# Patient Record
Sex: FEMALE
[De-identification: names, ages, dates, MRNs, and addresses within clinical notes are randomized per-mention and may not be internally consistent; named-entity substitution may affect disease eponyms.]

---

## 2017-03-30 ENCOUNTER — HOSPITAL (OUTPATIENT)
Dept: OTHER | Age: 22
End: 2017-03-30
Attending: EMERGENCY MEDICINE

## 2017-03-30 LAB
ANALYZER ANC (IANC): ABNORMAL
ANION GAP SERPL CALC-SCNC: 14 MMOL/L (ref 10–20)
APPEARANCE UR: CLEAR
BASOPHILS # BLD: 0 THOUSAND/MCL (ref 0–0.3)
BASOPHILS NFR BLD: 0 %
BILIRUB UR QL STRIP: NEGATIVE
BUN SERPL-MCNC: 8 MG/DL (ref 6–20)
BUN/CREAT SERPL: 12 (ref 7–25)
CALCIUM SERPL-MCNC: 9.2 MG/DL (ref 8.4–10.2)
CHLORIDE: 104 MMOL/L (ref 98–107)
CO2 SERPL-SCNC: 26 MMOL/L (ref 21–32)
COLOR UR: YELLOW
CREAT SERPL-MCNC: 0.65 MG/DL (ref 0.51–0.95)
DIFFERENTIAL METHOD BLD: ABNORMAL
EOSINOPHIL # BLD: 0 THOUSAND/MCL (ref 0.1–0.5)
EOSINOPHIL NFR BLD: 0 %
ERYTHROCYTE [DISTWIDTH] IN BLOOD: 13.6 % (ref 11–15)
GLUCOSE SERPL-MCNC: 109 MG/DL (ref 65–99)
GLUCOSE UR STRIP-MCNC: NEGATIVE MG/DL
HEMATOCRIT: 42.4 % (ref 36–46.5)
HEMOCCULT STL QL: NEGATIVE
HGB BLD-MCNC: 14.4 GM/DL (ref 12–15.5)
KETONES UR STRIP-MCNC: ABNORMAL MG/DL
LEUKOCYTE ESTERASE UR QL STRIP: NEGATIVE
LYMPHOCYTES # BLD: 1.9 THOUSAND/MCL (ref 1.2–5.2)
LYMPHOCYTES NFR BLD: 16 %
MCH RBC QN AUTO: 30.7 PG (ref 26–34)
MCHC RBC AUTO-ENTMCNC: 34 GM/DL (ref 32–36.5)
MCV RBC AUTO: 90.4 FL (ref 78–100)
MONOCYTES # BLD: 0.6 THOUSAND/MCL (ref 0.3–0.9)
MONOCYTES NFR BLD: 5 %
NEUTROPHILS # BLD: 9.3 THOUSAND/MCL (ref 1.8–8)
NEUTROPHILS NFR BLD: 79 %
NEUTS SEG NFR BLD: ABNORMAL %
NITRITE UR QL STRIP: NEGATIVE
PERCENT NRBC: ABNORMAL
PH UR STRIP: 7.5 UNIT (ref 5–7)
PLATELET # BLD: 294 THOUSAND/MCL (ref 140–450)
POTASSIUM SERPL-SCNC: 4 MMOL/L (ref 3.4–5.1)
PROT UR STRIP-MCNC: NEGATIVE MG/DL
RBC # BLD: 4.69 MILLION/MCL (ref 4–5.2)
SODIUM SERPL-SCNC: 140 MMOL/L (ref 135–145)
SP GR UR STRIP: 1.02 (ref 1–1.03)
UROBILINOGEN UR STRIP-MCNC: 0.2 MG/DL (ref 0–1)
WBC # BLD: 11.8 THOUSAND/MCL (ref 4.2–11)

## 2017-04-03 ENCOUNTER — HOSPITAL (OUTPATIENT)
Dept: OTHER | Age: 22
End: 2017-04-03
Attending: EMERGENCY MEDICINE

## 2017-04-03 LAB
ANALYZER ANC (IANC): ABNORMAL
ANION GAP SERPL CALC-SCNC: 11 MMOL/L (ref 10–20)
APPEARANCE UR: CLEAR
BASOPHILS # BLD: 0 THOUSAND/MCL (ref 0–0.3)
BASOPHILS NFR BLD: 0 %
BILIRUB UR QL STRIP: NEGATIVE
BUN SERPL-MCNC: 14 MG/DL (ref 6–20)
BUN/CREAT SERPL: 22 (ref 7–25)
CALCIUM SERPL-MCNC: 8.9 MG/DL (ref 8.4–10.2)
CHLORIDE: 107 MMOL/L (ref 98–107)
CO2 SERPL-SCNC: 25 MMOL/L (ref 21–32)
COLOR UR: YELLOW
CREAT SERPL-MCNC: 0.65 MG/DL (ref 0.51–0.95)
DIFFERENTIAL METHOD BLD: ABNORMAL
EOSINOPHIL # BLD: 0.1 THOUSAND/MCL (ref 0.1–0.5)
EOSINOPHIL NFR BLD: 1 %
ERYTHROCYTE [DISTWIDTH] IN BLOOD: 13.5 % (ref 11–15)
GLUCOSE SERPL-MCNC: 101 MG/DL (ref 65–99)
GLUCOSE UR STRIP-MCNC: NEGATIVE MG/DL
HEMATOCRIT: 41.5 % (ref 36–46.5)
HEMOCCULT STL QL: ABNORMAL
HGB BLD-MCNC: 14 GM/DL (ref 12–15.5)
KETONES UR STRIP-MCNC: NEGATIVE MG/DL
LEUKOCYTE ESTERASE UR QL STRIP: NEGATIVE
LYMPHOCYTES # BLD: 2.2 THOUSAND/MCL (ref 1–4.8)
LYMPHOCYTES NFR BLD: 21 %
MCH RBC QN AUTO: 30.6 PG (ref 26–34)
MCHC RBC AUTO-ENTMCNC: 33.7 GM/DL (ref 32–36.5)
MCV RBC AUTO: 90.6 FL (ref 78–100)
MONOCYTES # BLD: 0.5 THOUSAND/MCL (ref 0.3–0.9)
MONOCYTES NFR BLD: 5 %
NEUTROPHILS # BLD: 8.1 THOUSAND/MCL (ref 1.8–7.7)
NEUTROPHILS NFR BLD: 73 %
NEUTS SEG NFR BLD: ABNORMAL %
NITRITE UR QL STRIP: NEGATIVE
PERCENT NRBC: ABNORMAL
PH UR STRIP: 5.5 UNIT (ref 5–7)
PLATELET # BLD: 285 THOUSAND/MCL (ref 140–450)
POTASSIUM SERPL-SCNC: 3.9 MMOL/L (ref 3.4–5.1)
PROT UR STRIP-MCNC: NEGATIVE MG/DL
RBC # BLD: 4.58 MILLION/MCL (ref 4–5.2)
SODIUM SERPL-SCNC: 139 MMOL/L (ref 135–145)
SP GR UR STRIP: 1.01 (ref 1–1.03)
UROBILINOGEN UR STRIP-MCNC: 0.2 MG/DL (ref 0–1)
WBC # BLD: 10.9 THOUSAND/MCL (ref 4.2–11)

## 2017-11-08 ENCOUNTER — OFFICE VISIT (OUTPATIENT)
Dept: INTERNAL MEDICINE CLINIC | Facility: HOSPITAL | Age: 22
End: 2017-11-08
Attending: EMERGENCY MEDICINE

## 2017-11-08 DIAGNOSIS — Z00.00 WELLNESS EXAMINATION: Primary | ICD-10-CM

## 2017-11-08 PROCEDURE — 86787 VARICELLA-ZOSTER ANTIBODY: CPT

## 2018-03-08 ENCOUNTER — HOSPITAL (OUTPATIENT)
Dept: OTHER | Age: 23
End: 2018-03-08
Attending: EMERGENCY MEDICINE

## 2018-03-08 LAB
ALBUMIN SERPL-MCNC: 3.5 GM/DL (ref 3.6–5.1)
ALP SERPL-CCNC: 60 UNIT/L (ref 45–117)
ALT SERPL-CCNC: 13 UNIT/L
ANALYZER ANC (IANC): NORMAL
ANION GAP SERPL CALC-SCNC: 12 MMOL/L (ref 10–20)
APPEARANCE UR: CLEAR
AST SERPL-CCNC: 12 UNIT/L
BASOPHILS # BLD: 0 THOUSAND/MCL (ref 0–0.3)
BASOPHILS NFR BLD: 0 %
BILIRUB CONJ SERPL-MCNC: 0.1 MG/DL (ref 0–0.2)
BILIRUB SERPL-MCNC: 0.4 MG/DL (ref 0.2–1)
BILIRUB UR QL STRIP: NEGATIVE
BUN SERPL-MCNC: 16 MG/DL (ref 6–20)
BUN/CREAT SERPL: 20 (ref 7–25)
CALCIUM SERPL-MCNC: 8.8 MG/DL (ref 8.4–10.2)
CHLORIDE: 102 MMOL/L (ref 98–107)
CO2 SERPL-SCNC: 26 MMOL/L (ref 21–32)
COLOR UR: YELLOW
CREAT SERPL-MCNC: 0.8 MG/DL (ref 0.51–0.95)
DIFFERENTIAL METHOD BLD: NORMAL
EOSINOPHIL # BLD: 0.2 THOUSAND/MCL (ref 0.1–0.5)
EOSINOPHIL NFR BLD: 2 %
ERYTHROCYTE [DISTWIDTH] IN BLOOD: 13.7 % (ref 11–15)
GLUCOSE SERPL-MCNC: 101 MG/DL (ref 65–99)
GLUCOSE UR STRIP-MCNC: NEGATIVE MG/DL
HEMATOCRIT: 40.3 % (ref 36–46.5)
HEMOCCULT STL QL: NEGATIVE
HGB BLD-MCNC: 13.4 GM/DL (ref 12–15.5)
KETONES UR STRIP-MCNC: NEGATIVE MG/DL
LEUKOCYTE ESTERASE UR QL STRIP: NEGATIVE
LIPASE SERPL-CCNC: 77 UNIT/L (ref 73–393)
LYMPHOCYTES # BLD: 3.5 THOUSAND/MCL (ref 1–4.8)
LYMPHOCYTES NFR BLD: 38 %
MCH RBC QN AUTO: 30 PG (ref 26–34)
MCHC RBC AUTO-ENTMCNC: 33.3 GM/DL (ref 32–36.5)
MCV RBC AUTO: 90.4 FL (ref 78–100)
MONOCYTES # BLD: 0.6 THOUSAND/MCL (ref 0.3–0.9)
MONOCYTES NFR BLD: 7 %
NEUTROPHILS # BLD: 4.9 THOUSAND/MCL (ref 1.8–7.7)
NEUTROPHILS NFR BLD: 53 %
NEUTS SEG NFR BLD: NORMAL %
NITRITE UR QL STRIP: NEGATIVE
PERCENT NRBC: NORMAL
PH UR STRIP: 6 UNIT (ref 5–7)
PLATELET # BLD: 289 THOUSAND/MCL (ref 140–450)
POTASSIUM SERPL-SCNC: 3.6 MMOL/L (ref 3.4–5.1)
PROT SERPL-MCNC: 7.6 GM/DL (ref 6.4–8.2)
PROT UR STRIP-MCNC: NEGATIVE MG/DL
RBC # BLD: 4.46 MILLION/MCL (ref 4–5.2)
SODIUM SERPL-SCNC: 136 MMOL/L (ref 135–145)
SP GR UR STRIP: 1.02 (ref 1–1.03)
UROBILINOGEN UR STRIP-MCNC: 0.2 MG/DL (ref 0–1)
WBC # BLD: 9.2 THOUSAND/MCL (ref 4.2–11)

## 2018-03-29 ENCOUNTER — HOSPITAL ENCOUNTER (EMERGENCY)
Facility: HOSPITAL | Age: 23
Discharge: HOME | End: 2018-03-29
Attending: EMERGENCY MEDICINE
Payer: MEDICAID

## 2018-03-29 VITALS
HEIGHT: 61 IN | HEART RATE: 78 BPM | RESPIRATION RATE: 20 BRPM | OXYGEN SATURATION: 97 % | SYSTOLIC BLOOD PRESSURE: 114 MMHG | TEMPERATURE: 97 F | DIASTOLIC BLOOD PRESSURE: 77 MMHG | BODY MASS INDEX: 30.21 KG/M2 | WEIGHT: 160 LBS

## 2018-03-29 DIAGNOSIS — R11.2 NON-INTRACTABLE VOMITING WITH NAUSEA, UNSPECIFIED VOMITING TYPE: Primary | ICD-10-CM

## 2018-03-29 PROCEDURE — 81025 URINE PREGNANCY TEST: CPT

## 2018-03-29 PROCEDURE — 99283 EMERGENCY DEPT VISIT LOW MDM: CPT

## 2018-03-29 RX ORDER — ONDANSETRON 4 MG/1
4 TABLET, ORALLY DISINTEGRATING ORAL EVERY 4 HOURS PRN
Qty: 10 TABLET | Refills: 0 | Status: SHIPPED | OUTPATIENT
Start: 2018-03-29 | End: 2018-04-05

## 2018-03-29 RX ORDER — ONDANSETRON 4 MG/1
TABLET, ORALLY DISINTEGRATING ORAL
Status: COMPLETED
Start: 2018-03-29 | End: 2018-03-29

## 2018-03-29 RX ORDER — ONDANSETRON 4 MG/1
4 TABLET, ORALLY DISINTEGRATING ORAL ONCE
Status: COMPLETED | OUTPATIENT
Start: 2018-03-29 | End: 2018-03-29

## 2018-03-29 NOTE — ED PROVIDER NOTES
Patient Seen in: [facility] AND [facility] Emergency Department    History   Patient presents with:  Nausea/Vomiting/Diarrhea (gastrointestinal)    Stated Complaint: vomit     HPI    32-year-old female with no significant past medical history here with complaint °C) (Oral)   Resp 18   Ht 154.9 cm (5' 1\")   Wt 72.6 kg   SpO2 97%   BMI 30.23 kg/m²         Physical Exam   Constitutional: She is oriented to person, place, and time. She appears well-developed and well-nourished. No distress.    HENT:   Head: Normocepha elevated blood pressure reading in the Emergency Department. They were informed of the dangers of undiagnosed and untreated hypertension.   Education regarding lifestyle modifications and the need for appropriate follow-up with their PCP to have their bloo

## 2018-03-29 NOTE — ED INITIAL ASSESSMENT (HPI)
Pt c/o N/V starting yesterday + mid abdominal tightness, denies abdominal pain/constipation/diarrhea/ indigestion/GERD symptom/fever

## 2018-05-19 ENCOUNTER — HOSPITAL (OUTPATIENT)
Dept: OTHER | Age: 23
End: 2018-05-19
Attending: NURSE PRACTITIONER

## 2019-07-22 ENCOUNTER — HOSPITAL ENCOUNTER (EMERGENCY)
Facility: HOSPITAL | Age: 24
Discharge: HOME | End: 2019-07-22
Attending: EMERGENCY MEDICINE

## 2019-07-22 VITALS
BODY MASS INDEX: 33.04 KG/M2 | DIASTOLIC BLOOD PRESSURE: 67 MMHG | SYSTOLIC BLOOD PRESSURE: 127 MMHG | HEIGHT: 61 IN | TEMPERATURE: 99 F | RESPIRATION RATE: 18 BRPM | OXYGEN SATURATION: 100 % | WEIGHT: 175 LBS | HEART RATE: 74 BPM

## 2019-07-22 DIAGNOSIS — J01.10 ACUTE NON-RECURRENT FRONTAL SINUSITIS: ICD-10-CM

## 2019-07-22 DIAGNOSIS — J40 BRONCHITIS: Primary | ICD-10-CM

## 2019-07-22 PROCEDURE — 99283 EMERGENCY DEPT VISIT LOW MDM: CPT

## 2019-07-22 RX ORDER — PREDNISONE 20 MG/1
40 TABLET ORAL DAILY
Qty: 8 TABLET | Refills: 0 | Status: SHIPPED | OUTPATIENT
Start: 2019-07-22 | End: 2019-07-26

## 2019-07-22 RX ORDER — ACETAMINOPHEN 500 MG
1000 TABLET ORAL ONCE
Status: COMPLETED | OUTPATIENT
Start: 2019-07-22 | End: 2019-07-22

## 2019-07-22 NOTE — ED PROVIDER NOTES
Patient Seen in: [facility] AND [facility] Emergency Department    History   Patient presents with:  Headache (neurologic)    Stated Complaint: sore throat, headache, cough    HPI    23 yo female smoker with a h/o asthma.  She presents with at least one month of a normal mood and affect. Her behavior is normal.   Nursing note and vitals reviewed. ED Course   Labs Reviewed - No data to display           MDM         This is likely viral. Discharge on prednisone. Continue albuterol.  Also discussed smoking c

## 2019-07-26 ENCOUNTER — HOSPITAL ENCOUNTER (EMERGENCY)
Facility: HOSPITAL | Age: 24
Discharge: HOME | End: 2019-07-26
Attending: PHYSICIAN ASSISTANT

## 2019-07-26 VITALS
RESPIRATION RATE: 16 BRPM | HEART RATE: 88 BPM | OXYGEN SATURATION: 99 % | BODY MASS INDEX: 33 KG/M2 | WEIGHT: 174.19 LBS | TEMPERATURE: 98 F | DIASTOLIC BLOOD PRESSURE: 92 MMHG | SYSTOLIC BLOOD PRESSURE: 132 MMHG

## 2019-07-26 DIAGNOSIS — J02.9 ACUTE VIRAL PHARYNGITIS: Primary | ICD-10-CM

## 2019-07-26 LAB — S PYO AG THROAT QL: NEGATIVE

## 2019-07-26 PROCEDURE — 87430 STREP A AG IA: CPT

## 2019-07-26 PROCEDURE — 99282 EMERGENCY DEPT VISIT SF MDM: CPT

## 2019-07-26 RX ORDER — IBUPROFEN 600 MG/1
TABLET ORAL
Qty: 20 TABLET | Refills: 0 | Status: SHIPPED | OUTPATIENT
Start: 2019-07-26

## 2019-07-26 NOTE — ED INITIAL ASSESSMENT (HPI)
C/o sore throat and swollen lymph nodes over the last few days. Pt states she was here earlier this week, diagnosed with bronchitis and given prednisone with one dose remaining. Cough and fevers have resolved but the sore throat is new.  Denies n/v or DARLENE

## 2019-07-26 NOTE — ED PROVIDER NOTES
Patient Seen in: [facility] AND [facility] Emergency Department    History   Patient presents with:  Cough/URI    Stated Complaint:     HPI    21-year-old female presents with chief complaint of sore throat. Onset 3-4 days ago.   Patient states she was recently SpO2 99%   BMI 32.91 kg/m²       PULSE OX within normal limits on room air as interpreted by this provider. Constitutional: The patient is cooperative. Appears well-developed and well-nourished. Mild discomfort.   Psychological: Alert, No abnormalities [phone]    Schedule an appointment as soon as possible for a visit in 2 days  For follow-up    We recommend that you schedule follow up care with a primary care provider within the next three months to obtain basic health screening including re

## 2020-01-10 ENCOUNTER — HOSPITAL ENCOUNTER (EMERGENCY)
Facility: HOSPITAL | Age: 25
Discharge: HOME | End: 2020-01-10
Attending: EMERGENCY MEDICINE

## 2020-01-10 ENCOUNTER — APPOINTMENT (OUTPATIENT)
Dept: GENERAL RADIOLOGY | Facility: HOSPITAL | Age: 25
End: 2020-01-10
Attending: EMERGENCY MEDICINE

## 2020-01-10 VITALS
DIASTOLIC BLOOD PRESSURE: 64 MMHG | BODY MASS INDEX: 33.04 KG/M2 | TEMPERATURE: 99 F | SYSTOLIC BLOOD PRESSURE: 132 MMHG | HEART RATE: 52 BPM | WEIGHT: 175 LBS | OXYGEN SATURATION: 96 % | RESPIRATION RATE: 13 BRPM | HEIGHT: 61 IN

## 2020-01-10 DIAGNOSIS — J45.21 MILD INTERMITTENT ASTHMA WITH EXACERBATION: Primary | ICD-10-CM

## 2020-01-10 PROCEDURE — 71046 X-RAY EXAM CHEST 2 VIEWS: CPT | Performed by: EMERGENCY MEDICINE

## 2020-01-10 PROCEDURE — 94640 AIRWAY INHALATION TREATMENT: CPT

## 2020-01-10 PROCEDURE — 99284 EMERGENCY DEPT VISIT MOD MDM: CPT

## 2020-01-10 RX ORDER — ALBUTEROL SULFATE 2.5 MG/3ML
2.5 SOLUTION RESPIRATORY (INHALATION) EVERY 4 HOURS PRN
Qty: 30 AMPULE | Refills: 0 | Status: SHIPPED | OUTPATIENT
Start: 2020-01-10 | End: 2020-02-09

## 2020-01-10 RX ORDER — PREDNISONE 20 MG/1
60 TABLET ORAL DAILY
Qty: 15 TABLET | Refills: 0 | Status: SHIPPED | OUTPATIENT
Start: 2020-01-10 | End: 2020-01-15

## 2020-01-10 RX ORDER — IPRATROPIUM BROMIDE AND ALBUTEROL SULFATE 2.5; .5 MG/3ML; MG/3ML
3 SOLUTION RESPIRATORY (INHALATION) ONCE
Status: COMPLETED | OUTPATIENT
Start: 2020-01-10 | End: 2020-01-10

## 2020-01-10 RX ORDER — PREDNISONE 20 MG/1
60 TABLET ORAL ONCE
Status: COMPLETED | OUTPATIENT
Start: 2020-01-10 | End: 2020-01-10

## 2020-01-10 NOTE — ED PROVIDER NOTES
Patient Seen in: [facility] AND [facility] Emergency Department      History   Patient presents with:  Cough/URI    Stated Complaint: COUGH 1 WEEK    HPI    72-year-old female presents the ER with complaint of asthma exacerbation.   Patient states his been feelin General: Bowel sounds are normal.      Palpations: Abdomen is soft. Musculoskeletal: Normal range of motion. Skin:     General: Skin is warm. Capillary Refill: Capillary refill takes less than 2 seconds.    Neurological:      General: No focal defi

## 2020-12-27 ENCOUNTER — HOSPITAL ENCOUNTER (EMERGENCY)
Facility: HOSPITAL | Age: 25
Discharge: HOME | End: 2020-12-27
Attending: EMERGENCY MEDICINE

## 2020-12-27 VITALS
HEART RATE: 80 BPM | BODY MASS INDEX: 35.5 KG/M2 | TEMPERATURE: 98 F | SYSTOLIC BLOOD PRESSURE: 134 MMHG | WEIGHT: 188 LBS | DIASTOLIC BLOOD PRESSURE: 76 MMHG | RESPIRATION RATE: 16 BRPM | OXYGEN SATURATION: 97 % | HEIGHT: 61 IN

## 2020-12-27 DIAGNOSIS — K00.7 TOOTH ERUPTION: Primary | ICD-10-CM

## 2020-12-27 PROCEDURE — 99283 EMERGENCY DEPT VISIT LOW MDM: CPT

## 2020-12-27 RX ORDER — CLINDAMYCIN HYDROCHLORIDE 300 MG/1
300 CAPSULE ORAL 3 TIMES DAILY
Qty: 21 CAPSULE | Refills: 0 | Status: SHIPPED | OUTPATIENT
Start: 2020-12-27 | End: 2021-01-03

## 2020-12-27 RX ORDER — HYDROCODONE BITARTRATE AND ACETAMINOPHEN 5; 325 MG/1; MG/1
1-2 TABLET ORAL EVERY 6 HOURS PRN
Qty: 10 TABLET | Refills: 0 | Status: SHIPPED | OUTPATIENT
Start: 2020-12-27 | End: 2021-01-03

## 2020-12-27 NOTE — ED PROVIDER NOTES
Patient Seen in: [facility] AND [facility] Emergency Department      History   Patient presents with:  Dental Problem    Stated Complaint: dental complaint    HPI    50-year-old female with history of asthma presents with complaints of dental pain.   The patient tender  Skin: no rashes or lesions    ED Course   Labs Reviewed - No data to display             MDM      We will discharge home with empiric antibiotics and recommendation that she follow-up with oral surgery or a dentist tomorrow if possible.   She is adv

## 2021-05-14 ENCOUNTER — NURSE ONLY (OUTPATIENT)
Dept: FAMILY MEDICINE | Age: 26
End: 2021-05-14

## 2021-05-14 DIAGNOSIS — Z20.822 EXPOSURE TO COVID-19 VIRUS: Primary | ICD-10-CM

## 2021-05-14 LAB — SARS-COV+SARS-COV-2 AG RESP QL IA.RAPID: NOT DETECTED

## 2021-05-14 PROCEDURE — 87426 SARSCOV CORONAVIRUS AG IA: CPT | Performed by: NURSE PRACTITIONER

## 2021-07-20 ENCOUNTER — HOSPITAL ENCOUNTER (EMERGENCY)
Facility: HOSPITAL | Age: 26
Discharge: HOME | End: 2021-07-20

## 2021-07-20 VITALS
TEMPERATURE: 98 F | SYSTOLIC BLOOD PRESSURE: 133 MMHG | RESPIRATION RATE: 18 BRPM | DIASTOLIC BLOOD PRESSURE: 88 MMHG | HEART RATE: 94 BPM | OXYGEN SATURATION: 99 %

## 2021-07-20 DIAGNOSIS — Z32.02 NEGATIVE PREGNANCY TEST: ICD-10-CM

## 2021-07-20 DIAGNOSIS — R11.2 NAUSEA AND VOMITING IN ADULT: Primary | ICD-10-CM

## 2021-07-20 LAB
ANION GAP SERPL CALC-SCNC: 7 MMOL/L (ref 0–18)
B-HCG SERPL-ACNC: <1 MIU/ML
B-HCG UR QL: NEGATIVE
BASOPHILS # BLD AUTO: 0.08 X10(3) UL (ref 0–0.2)
BASOPHILS NFR BLD AUTO: 0.7 %
BILIRUB UR QL: NEGATIVE
BUN BLD-MCNC: 9 MG/DL (ref 7–18)
BUN/CREAT SERPL: 11.5 (ref 10–20)
CALCIUM BLD-MCNC: 9.4 MG/DL (ref 8.5–10.1)
CHLORIDE SERPL-SCNC: 106 MMOL/L (ref 98–112)
CO2 SERPL-SCNC: 25 MMOL/L (ref 21–32)
COLOR UR: YELLOW
CREAT BLD-MCNC: 0.78 MG/DL
DEPRECATED RDW RBC AUTO: 43.3 FL (ref 35.1–46.3)
EOSINOPHIL # BLD AUTO: 0.1 X10(3) UL (ref 0–0.7)
EOSINOPHIL NFR BLD AUTO: 0.8 %
ERYTHROCYTE [DISTWIDTH] IN BLOOD BY AUTOMATED COUNT: 13.1 % (ref 11–15)
GLUCOSE BLD-MCNC: 84 MG/DL (ref 70–99)
GLUCOSE UR-MCNC: NEGATIVE MG/DL
HCT VFR BLD AUTO: 42.4 %
HGB BLD-MCNC: 14.4 G/DL
HGB UR QL STRIP.AUTO: NEGATIVE
IMM GRANULOCYTES # BLD AUTO: 0.04 X10(3) UL (ref 0–1)
IMM GRANULOCYTES NFR BLD: 0.3 %
KETONES UR-MCNC: 80 MG/DL
LYMPHOCYTES # BLD AUTO: 2.61 X10(3) UL (ref 1–4)
LYMPHOCYTES NFR BLD AUTO: 22.1 %
MCH RBC QN AUTO: 30.6 PG (ref 26–34)
MCHC RBC AUTO-ENTMCNC: 34 G/DL (ref 31–37)
MCV RBC AUTO: 90.2 FL
MONOCYTES # BLD AUTO: 0.61 X10(3) UL (ref 0.1–1)
MONOCYTES NFR BLD AUTO: 5.2 %
NEUTROPHILS # BLD AUTO: 8.36 X10 (3) UL (ref 1.5–7.7)
NEUTROPHILS # BLD AUTO: 8.36 X10(3) UL (ref 1.5–7.7)
NEUTROPHILS NFR BLD AUTO: 70.9 %
NITRITE UR QL STRIP.AUTO: NEGATIVE
OSMOLALITY SERPL CALC.SUM OF ELEC: 284 MOSM/KG (ref 275–295)
PH UR: 5 [PH] (ref 5–8)
PLATELET # BLD AUTO: 311 10(3)UL (ref 150–450)
POTASSIUM SERPL-SCNC: 3.6 MMOL/L (ref 3.5–5.1)
PROT UR-MCNC: NEGATIVE MG/DL
RBC # BLD AUTO: 4.7 X10(6)UL
SODIUM SERPL-SCNC: 138 MMOL/L (ref 136–145)
SP GR UR STRIP: 1.03 (ref 1–1.03)
UROBILINOGEN UR STRIP-ACNC: 2
WBC # BLD AUTO: 11.8 X10(3) UL (ref 4–11)

## 2021-07-20 PROCEDURE — 81001 URINALYSIS AUTO W/SCOPE: CPT

## 2021-07-20 PROCEDURE — 80048 BASIC METABOLIC PNL TOTAL CA: CPT

## 2021-07-20 PROCEDURE — 81025 URINE PREGNANCY TEST: CPT

## 2021-07-20 PROCEDURE — 96374 THER/PROPH/DIAG INJ IV PUSH: CPT

## 2021-07-20 PROCEDURE — 85025 COMPLETE CBC W/AUTO DIFF WBC: CPT

## 2021-07-20 PROCEDURE — 84702 CHORIONIC GONADOTROPIN TEST: CPT | Performed by: NURSE PRACTITIONER

## 2021-07-20 PROCEDURE — 99284 EMERGENCY DEPT VISIT MOD MDM: CPT

## 2021-07-20 RX ORDER — ONDANSETRON 4 MG/1
4 TABLET, ORALLY DISINTEGRATING ORAL EVERY 4 HOURS PRN
Qty: 10 TABLET | Refills: 0 | Status: SHIPPED | OUTPATIENT
Start: 2021-07-20 | End: 2021-07-27

## 2021-07-20 RX ORDER — ONDANSETRON 2 MG/ML
4 INJECTION INTRAMUSCULAR; INTRAVENOUS ONCE
Status: COMPLETED | OUTPATIENT
Start: 2021-07-20 | End: 2021-07-20

## 2021-07-20 NOTE — ED INITIAL ASSESSMENT (HPI)
Patient aox3 to ed via private vehicle patient reported n/v x Friday denies abd pain patient reported abnormal period and was concerned for pregnancy. Took 3x pregnancy test at home and was negative.  Denies abd pain

## 2021-07-20 NOTE — ED PROVIDER NOTES
Patient Seen in: [facility] AND [facility] Emergency Department      History   Patient presents with:  Nausea/Vomiting/Diarrhea    Stated Complaint: n/v    HPI/Subjective:   27yo/f w hx of asthma reports to the ED With complaints of nausea, vomiting in the AM x Pulmonary:      Effort: Pulmonary effort is normal.      Breath sounds: Normal breath sounds. Abdominal:      General: Bowel sounds are normal.      Palpations: Abdomen is soft. Musculoskeletal:         General: No tenderness or deformity.  Normal ran exam as stated complaints nausea and vomiting    abd soft ntnd  No fevers  No chest pain  Neg preg by urine and blood  No urinary symptoms  Labs unremarkable      Plan  Dc to home  Close fu    Counseled patient on home care, ss of worsening condition, reas

## 2021-10-17 ENCOUNTER — HOSPITAL ENCOUNTER (EMERGENCY)
Facility: HOSPITAL | Age: 26
Discharge: HOME | End: 2021-10-17
Attending: EMERGENCY MEDICINE

## 2021-10-17 VITALS
HEIGHT: 61 IN | HEART RATE: 73 BPM | WEIGHT: 188 LBS | SYSTOLIC BLOOD PRESSURE: 138 MMHG | BODY MASS INDEX: 35.5 KG/M2 | OXYGEN SATURATION: 97 % | TEMPERATURE: 98 F | DIASTOLIC BLOOD PRESSURE: 90 MMHG | RESPIRATION RATE: 18 BRPM

## 2021-10-17 DIAGNOSIS — N30.00 ACUTE CYSTITIS WITHOUT HEMATURIA: Primary | ICD-10-CM

## 2021-10-17 PROCEDURE — 81025 URINE PREGNANCY TEST: CPT

## 2021-10-17 PROCEDURE — 87591 N.GONORRHOEAE DNA AMP PROB: CPT | Performed by: EMERGENCY MEDICINE

## 2021-10-17 PROCEDURE — 87491 CHLMYD TRACH DNA AMP PROBE: CPT | Performed by: EMERGENCY MEDICINE

## 2021-10-17 PROCEDURE — 81001 URINALYSIS AUTO W/SCOPE: CPT | Performed by: EMERGENCY MEDICINE

## 2021-10-17 PROCEDURE — 99283 EMERGENCY DEPT VISIT LOW MDM: CPT

## 2021-10-17 PROCEDURE — 87086 URINE CULTURE/COLONY COUNT: CPT | Performed by: EMERGENCY MEDICINE

## 2021-10-17 RX ORDER — NITROFURANTOIN 25; 75 MG/1; MG/1
100 CAPSULE ORAL 2 TIMES DAILY
Qty: 14 CAPSULE | Refills: 0 | Status: SHIPPED | OUTPATIENT
Start: 2021-10-17 | End: 2021-10-24

## 2021-10-17 NOTE — ED INITIAL ASSESSMENT (HPI)
Increased urinary frequency for a couple days. States mild lower abdominal cramping. Denies discharge or fevers.

## 2021-10-17 NOTE — ED PROVIDER NOTES
Patient Seen in: [facility] AND [facility] Emergency Department      History   Patient presents with:  Abdominal Pain    Stated Complaint: lower abdominal pain and urge to urinate    Subjective:   HPI    23year-old with urinary urgency for several days without includes UTI.       ED Course     Labs Reviewed   URINALYSIS WITH CULTURE REFLEX - Abnormal; Notable for the following components:       Result Value    Clarity Urine Cloudy (*)     Protein Urine 30  (*)     Urobilinogen Urine 2.0 (*)     Leukocyte Esterase

## 2021-12-28 ENCOUNTER — HOSPITAL ENCOUNTER (EMERGENCY)
Facility: HOSPITAL | Age: 26
Discharge: HOME | End: 2021-12-28
Attending: EMERGENCY MEDICINE
Payer: OTHER GOVERNMENT

## 2021-12-28 VITALS
SYSTOLIC BLOOD PRESSURE: 140 MMHG | DIASTOLIC BLOOD PRESSURE: 91 MMHG | BODY MASS INDEX: 34.93 KG/M2 | WEIGHT: 185 LBS | HEIGHT: 61 IN | RESPIRATION RATE: 18 BRPM | OXYGEN SATURATION: 99 % | TEMPERATURE: 98 F | HEART RATE: 92 BPM

## 2021-12-28 DIAGNOSIS — J45.909 MILD ASTHMA WITHOUT COMPLICATION, UNSPECIFIED WHETHER PERSISTENT: ICD-10-CM

## 2021-12-28 DIAGNOSIS — Z20.822 ENCOUNTER FOR LABORATORY TESTING FOR COVID-19 VIRUS: Primary | ICD-10-CM

## 2021-12-28 PROCEDURE — 99283 EMERGENCY DEPT VISIT LOW MDM: CPT

## 2021-12-28 RX ORDER — ALBUTEROL SULFATE 2.5 MG/3ML
2.5 SOLUTION RESPIRATORY (INHALATION) EVERY 4 HOURS PRN
Qty: 30 EACH | Refills: 0 | Status: SHIPPED | OUTPATIENT
Start: 2021-12-28 | End: 2022-01-27

## 2021-12-28 NOTE — ED PROVIDER NOTES
Patient Seen in: [facility] Emergency Department    History   Patient presents with:  Testing      HPI    50-year-old female presents the ER for COVID-19 testing. Patient with a past medical history of asthma. Patient's oxygen saturation 99%.   Caitlin Andrews Ear: External ear normal.      Left Ear: External ear normal.      Nose: Nose normal.   Eyes:      Conjunctiva/sclera: Conjunctivae normal.      Pupils: Pupils are equal, round, and reactive to light.    Cardiovascular:      Rate and Rhythm: Normal rate and evaluation. COVID-19 counseling performed.  -Discussed the need for immediate isolation, even before results of the test are available.   -Advised patient to inform their immediate household/contacts that they may wish to be tested and quarantine as well

## 2021-12-29 LAB — SARS-COV-2 RNA RESP QL NAA+PROBE: DETECTED

## 2022-04-10 ENCOUNTER — HOSPITAL ENCOUNTER (EMERGENCY)
Facility: HOSPITAL | Age: 27
Discharge: HOME | End: 2022-04-10
Attending: EMERGENCY MEDICINE

## 2022-04-10 VITALS
BODY MASS INDEX: 36 KG/M2 | RESPIRATION RATE: 18 BRPM | OXYGEN SATURATION: 98 % | SYSTOLIC BLOOD PRESSURE: 133 MMHG | DIASTOLIC BLOOD PRESSURE: 78 MMHG | WEIGHT: 189 LBS | TEMPERATURE: 98 F | HEART RATE: 79 BPM

## 2022-04-10 DIAGNOSIS — R19.7 NAUSEA VOMITING AND DIARRHEA: Primary | ICD-10-CM

## 2022-04-10 DIAGNOSIS — R11.2 NAUSEA VOMITING AND DIARRHEA: Primary | ICD-10-CM

## 2022-04-10 LAB
ALBUMIN SERPL-MCNC: 3.6 G/DL (ref 3.4–5)
ALP LIVER SERPL-CCNC: 63 U/L
ALT SERPL-CCNC: 21 U/L
ANION GAP SERPL CALC-SCNC: 6 MMOL/L (ref 0–18)
AST SERPL-CCNC: 14 U/L (ref 15–37)
B-HCG UR QL: NEGATIVE
BASOPHILS # BLD AUTO: 0.02 X10(3) UL (ref 0–0.2)
BASOPHILS NFR BLD AUTO: 0.2 %
BILIRUB DIRECT SERPL-MCNC: 0.2 MG/DL (ref 0–0.2)
BILIRUB SERPL-MCNC: 0.4 MG/DL (ref 0.1–2)
BILIRUB UR QL: NEGATIVE
BUN BLD-MCNC: 8 MG/DL (ref 7–18)
BUN/CREAT SERPL: 9.3 (ref 10–20)
CALCIUM BLD-MCNC: 8.7 MG/DL (ref 8.5–10.1)
CHLORIDE SERPL-SCNC: 106 MMOL/L (ref 98–112)
CO2 SERPL-SCNC: 26 MMOL/L (ref 21–32)
COLOR UR: YELLOW
CREAT BLD-MCNC: 0.86 MG/DL
DEPRECATED RDW RBC AUTO: 45.6 FL (ref 35.1–46.3)
EOSINOPHIL # BLD AUTO: 0.01 X10(3) UL (ref 0–0.7)
EOSINOPHIL NFR BLD AUTO: 0.1 %
ERYTHROCYTE [DISTWIDTH] IN BLOOD BY AUTOMATED COUNT: 13.5 % (ref 11–15)
GLUCOSE BLD-MCNC: 95 MG/DL (ref 70–99)
GLUCOSE UR-MCNC: NEGATIVE MG/DL
HCT VFR BLD AUTO: 43.6 %
HGB BLD-MCNC: 14.6 G/DL
HGB UR QL STRIP.AUTO: NEGATIVE
IMM GRANULOCYTES # BLD AUTO: 0.03 X10(3) UL (ref 0–1)
IMM GRANULOCYTES NFR BLD: 0.4 %
KETONES UR-MCNC: 80 MG/DL
LIPASE SERPL-CCNC: 56 U/L (ref 73–393)
LYMPHOCYTES # BLD AUTO: 0.81 X10(3) UL (ref 1–4)
LYMPHOCYTES NFR BLD AUTO: 9.9 %
MCH RBC QN AUTO: 30.5 PG (ref 26–34)
MCHC RBC AUTO-ENTMCNC: 33.5 G/DL (ref 31–37)
MCV RBC AUTO: 91 FL
MONOCYTES # BLD AUTO: 0.55 X10(3) UL (ref 0.1–1)
MONOCYTES NFR BLD AUTO: 6.7 %
NEUTROPHILS # BLD AUTO: 6.76 X10 (3) UL (ref 1.5–7.7)
NEUTROPHILS # BLD AUTO: 6.76 X10(3) UL (ref 1.5–7.7)
NEUTROPHILS NFR BLD AUTO: 82.7 %
NITRITE UR QL STRIP.AUTO: NEGATIVE
OSMOLALITY SERPL CALC.SUM OF ELEC: 284 MOSM/KG (ref 275–295)
PH UR: 5 [PH] (ref 5–8)
PLATELET # BLD AUTO: 271 10(3)UL (ref 150–450)
POTASSIUM SERPL-SCNC: 3.6 MMOL/L (ref 3.5–5.1)
PROT SERPL-MCNC: 7.8 G/DL (ref 6.4–8.2)
PROT UR-MCNC: 30 MG/DL
RBC # BLD AUTO: 4.79 X10(6)UL
SODIUM SERPL-SCNC: 138 MMOL/L (ref 136–145)
SP GR UR STRIP: 1.03 (ref 1–1.03)
UROBILINOGEN UR STRIP-ACNC: <2
VIT C UR-MCNC: NEGATIVE MG/DL
WBC # BLD AUTO: 8.2 X10(3) UL (ref 4–11)

## 2022-04-10 PROCEDURE — 83690 ASSAY OF LIPASE: CPT | Performed by: EMERGENCY MEDICINE

## 2022-04-10 PROCEDURE — 96374 THER/PROPH/DIAG INJ IV PUSH: CPT

## 2022-04-10 PROCEDURE — 80076 HEPATIC FUNCTION PANEL: CPT | Performed by: EMERGENCY MEDICINE

## 2022-04-10 PROCEDURE — 80048 BASIC METABOLIC PNL TOTAL CA: CPT | Performed by: EMERGENCY MEDICINE

## 2022-04-10 PROCEDURE — 87086 URINE CULTURE/COLONY COUNT: CPT | Performed by: EMERGENCY MEDICINE

## 2022-04-10 PROCEDURE — 81001 URINALYSIS AUTO W/SCOPE: CPT | Performed by: EMERGENCY MEDICINE

## 2022-04-10 PROCEDURE — 85025 COMPLETE CBC W/AUTO DIFF WBC: CPT | Performed by: EMERGENCY MEDICINE

## 2022-04-10 PROCEDURE — 96361 HYDRATE IV INFUSION ADD-ON: CPT

## 2022-04-10 PROCEDURE — 81025 URINE PREGNANCY TEST: CPT

## 2022-04-10 PROCEDURE — 99284 EMERGENCY DEPT VISIT MOD MDM: CPT

## 2022-04-10 RX ORDER — ONDANSETRON 2 MG/ML
4 INJECTION INTRAMUSCULAR; INTRAVENOUS ONCE
Status: COMPLETED | OUTPATIENT
Start: 2022-04-10 | End: 2022-04-10

## 2022-04-10 RX ORDER — ONDANSETRON 4 MG/1
4 TABLET, ORALLY DISINTEGRATING ORAL EVERY 4 HOURS PRN
Qty: 10 TABLET | Refills: 0 | Status: SHIPPED | OUTPATIENT
Start: 2022-04-10 | End: 2022-04-17

## 2023-05-10 PROCEDURE — 99284 EMERGENCY DEPT VISIT MOD MDM: CPT

## 2023-05-10 PROCEDURE — 99285 EMERGENCY DEPT VISIT HI MDM: CPT

## 2023-05-10 PROCEDURE — 93010 ELECTROCARDIOGRAM REPORT: CPT

## 2023-05-10 PROCEDURE — 96374 THER/PROPH/DIAG INJ IV PUSH: CPT

## 2023-05-11 ENCOUNTER — HOSPITAL ENCOUNTER (EMERGENCY)
Facility: HOSPITAL | Age: 28
Discharge: HOME | End: 2023-05-11
Payer: MEDICAID

## 2023-05-11 ENCOUNTER — APPOINTMENT (OUTPATIENT)
Dept: CT IMAGING | Facility: HOSPITAL | Age: 28
End: 2023-05-11
Attending: NURSE PRACTITIONER
Payer: MEDICAID

## 2023-05-11 ENCOUNTER — APPOINTMENT (OUTPATIENT)
Dept: GENERAL RADIOLOGY | Facility: HOSPITAL | Age: 28
End: 2023-05-11
Attending: NURSE PRACTITIONER
Payer: MEDICAID

## 2023-05-11 VITALS
OXYGEN SATURATION: 98 % | WEIGHT: 193 LBS | BODY MASS INDEX: 36.44 KG/M2 | HEIGHT: 61 IN | SYSTOLIC BLOOD PRESSURE: 133 MMHG | HEART RATE: 87 BPM | RESPIRATION RATE: 20 BRPM | DIASTOLIC BLOOD PRESSURE: 91 MMHG

## 2023-05-11 DIAGNOSIS — Z71.6 ENCOUNTER FOR SMOKING CESSATION COUNSELING: ICD-10-CM

## 2023-05-11 DIAGNOSIS — J18.9 PNEUMONIA OF RIGHT LOWER LOBE DUE TO INFECTIOUS ORGANISM: Primary | ICD-10-CM

## 2023-05-11 LAB
ALBUMIN SERPL-MCNC: 3.3 G/DL (ref 3.4–5)
ALP LIVER SERPL-CCNC: 62 U/L
ALT SERPL-CCNC: 10 U/L
ANION GAP SERPL CALC-SCNC: 8 MMOL/L (ref 0–18)
AST SERPL-CCNC: 8 U/L (ref 15–37)
ATRIAL RATE: 93 BPM
B-HCG UR QL: NEGATIVE
BASOPHILS # BLD AUTO: 0.05 X10(3) UL (ref 0–0.2)
BASOPHILS NFR BLD AUTO: 0.3 %
BILIRUB DIRECT SERPL-MCNC: 0.2 MG/DL (ref 0–0.2)
BILIRUB SERPL-MCNC: 0.5 MG/DL (ref 0.1–2)
BUN BLD-MCNC: 9 MG/DL (ref 7–18)
BUN/CREAT SERPL: 11.3 (ref 10–20)
CALCIUM BLD-MCNC: 8.9 MG/DL (ref 8.5–10.1)
CHLORIDE SERPL-SCNC: 103 MMOL/L (ref 98–112)
CO2 SERPL-SCNC: 23 MMOL/L (ref 21–32)
CREAT BLD-MCNC: 0.8 MG/DL
D DIMER PPP FEU-MCNC: 1.46 UG/ML FEU (ref ?–0.5)
DEPRECATED RDW RBC AUTO: 42.1 FL (ref 35.1–46.3)
EOSINOPHIL # BLD AUTO: 0.1 X10(3) UL (ref 0–0.7)
EOSINOPHIL NFR BLD AUTO: 0.7 %
ERYTHROCYTE [DISTWIDTH] IN BLOOD BY AUTOMATED COUNT: 12.8 % (ref 11–15)
GFR SERPLBLD BASED ON 1.73 SQ M-ARVRAT: 104 ML/MIN/1.73M2 (ref 60–?)
GLUCOSE BLD-MCNC: 122 MG/DL (ref 70–99)
HCT VFR BLD AUTO: 38.7 %
HGB BLD-MCNC: 13.3 G/DL
IMM GRANULOCYTES # BLD AUTO: 0.07 X10(3) UL (ref 0–1)
IMM GRANULOCYTES NFR BLD: 0.5 %
LIPASE SERPL-CCNC: 13 U/L (ref 13–75)
LYMPHOCYTES # BLD AUTO: 1.61 X10(3) UL (ref 1–4)
LYMPHOCYTES NFR BLD AUTO: 10.6 %
MCH RBC QN AUTO: 30.6 PG (ref 26–34)
MCHC RBC AUTO-ENTMCNC: 34.4 G/DL (ref 31–37)
MCV RBC AUTO: 89 FL
MONOCYTES # BLD AUTO: 1.48 X10(3) UL (ref 0.1–1)
MONOCYTES NFR BLD AUTO: 9.8 %
NEUTROPHILS # BLD AUTO: 11.84 X10 (3) UL (ref 1.5–7.7)
NEUTROPHILS # BLD AUTO: 11.84 X10(3) UL (ref 1.5–7.7)
NEUTROPHILS NFR BLD AUTO: 78.1 %
OSMOLALITY SERPL CALC.SUM OF ELEC: 278 MOSM/KG (ref 275–295)
P AXIS: 22 DEGREES
P-R INTERVAL: 140 MS
PLATELET # BLD AUTO: 342 10(3)UL (ref 150–450)
POTASSIUM SERPL-SCNC: 3.3 MMOL/L (ref 3.5–5.1)
PROT SERPL-MCNC: 8.2 G/DL (ref 6.4–8.2)
Q-T INTERVAL: 344 MS
QRS DURATION: 72 MS
QTC CALCULATION (BEZET): 427 MS
R AXIS: -4 DEGREES
RBC # BLD AUTO: 4.35 X10(6)UL
SODIUM SERPL-SCNC: 134 MMOL/L (ref 136–145)
T AXIS: 3 DEGREES
TROPONIN I HIGH SENSITIVITY: 3 NG/L
VENTRICULAR RATE: 93 BPM
WBC # BLD AUTO: 15.2 X10(3) UL (ref 4–11)

## 2023-05-11 PROCEDURE — 80076 HEPATIC FUNCTION PANEL: CPT | Performed by: NURSE PRACTITIONER

## 2023-05-11 PROCEDURE — 93005 ELECTROCARDIOGRAM TRACING: CPT

## 2023-05-11 PROCEDURE — 85379 FIBRIN DEGRADATION QUANT: CPT | Performed by: NURSE PRACTITIONER

## 2023-05-11 PROCEDURE — 80048 BASIC METABOLIC PNL TOTAL CA: CPT | Performed by: NURSE PRACTITIONER

## 2023-05-11 PROCEDURE — 83690 ASSAY OF LIPASE: CPT | Performed by: NURSE PRACTITIONER

## 2023-05-11 PROCEDURE — 85025 COMPLETE CBC W/AUTO DIFF WBC: CPT | Performed by: NURSE PRACTITIONER

## 2023-05-11 PROCEDURE — 81025 URINE PREGNANCY TEST: CPT

## 2023-05-11 PROCEDURE — 71260 CT THORAX DX C+: CPT | Performed by: NURSE PRACTITIONER

## 2023-05-11 PROCEDURE — 84484 ASSAY OF TROPONIN QUANT: CPT | Performed by: NURSE PRACTITIONER

## 2023-05-11 PROCEDURE — 71045 X-RAY EXAM CHEST 1 VIEW: CPT | Performed by: NURSE PRACTITIONER

## 2023-05-11 RX ORDER — DOXYCYCLINE HYCLATE 100 MG/1
100 CAPSULE ORAL ONCE
Status: COMPLETED | OUTPATIENT
Start: 2023-05-11 | End: 2023-05-11

## 2023-05-11 RX ORDER — KETOROLAC TROMETHAMINE 15 MG/ML
15 INJECTION, SOLUTION INTRAMUSCULAR; INTRAVENOUS ONCE
Status: COMPLETED | OUTPATIENT
Start: 2023-05-11 | End: 2023-05-11

## 2023-05-11 RX ORDER — DOXYCYCLINE HYCLATE 100 MG/1
100 CAPSULE ORAL 2 TIMES DAILY
Qty: 14 CAPSULE | Refills: 0 | Status: SHIPPED | OUTPATIENT
Start: 2023-05-11 | End: 2023-05-18

## 2023-05-11 NOTE — ED INITIAL ASSESSMENT (HPI)
Pt presents from home with c/o headache that is not being relieved with naproxen and RUQ pain leading to SOB that just started 30 min PTA.

## 2023-05-17 ENCOUNTER — OFFICE VISIT (OUTPATIENT)
Dept: FAMILY MEDICINE CLINIC | Facility: CLINIC | Age: 28
End: 2023-05-17

## 2023-05-17 VITALS
HEART RATE: 130 BPM | DIASTOLIC BLOOD PRESSURE: 87 MMHG | WEIGHT: 179.19 LBS | SYSTOLIC BLOOD PRESSURE: 118 MMHG | BODY MASS INDEX: 33.83 KG/M2 | HEIGHT: 61 IN

## 2023-05-17 DIAGNOSIS — R11.0 NAUSEA: ICD-10-CM

## 2023-05-17 DIAGNOSIS — J18.9 COMMUNITY ACQUIRED PNEUMONIA OF RIGHT LOWER LOBE OF LUNG: Primary | ICD-10-CM

## 2023-05-17 PROCEDURE — 3074F SYST BP LT 130 MM HG: CPT | Performed by: NURSE PRACTITIONER

## 2023-05-17 PROCEDURE — 3079F DIAST BP 80-89 MM HG: CPT | Performed by: NURSE PRACTITIONER

## 2023-05-17 PROCEDURE — 99204 OFFICE O/P NEW MOD 45 MIN: CPT | Performed by: NURSE PRACTITIONER

## 2023-05-17 PROCEDURE — 3008F BODY MASS INDEX DOCD: CPT | Performed by: NURSE PRACTITIONER

## 2023-05-17 RX ORDER — ONDANSETRON 4 MG/1
4 TABLET, ORALLY DISINTEGRATING ORAL EVERY 8 HOURS PRN
Qty: 15 TABLET | Refills: 0 | Status: SHIPPED | OUTPATIENT
Start: 2023-05-17

## 2023-05-17 RX ORDER — AZITHROMYCIN 250 MG/1
TABLET, FILM COATED ORAL
Qty: 6 TABLET | Refills: 0 | Status: SHIPPED | OUTPATIENT
Start: 2023-05-17 | End: 2023-05-22

## 2023-05-17 RX ORDER — CEFDINIR 300 MG/1
300 CAPSULE ORAL 2 TIMES DAILY
Qty: 14 CAPSULE | Refills: 0 | Status: SHIPPED | OUTPATIENT
Start: 2023-05-17 | End: 2023-05-24

## 2023-05-17 NOTE — ASSESSMENT & PLAN NOTE
Stop doxycycline  Start zpack and omnicef 300 mg   Add zofran for nausea  Continue to avoid smoking  Supportive care discussed to help alleviate symptoms  Please call if symptoms worsen or are not resolving.

## 2023-05-17 NOTE — PATIENT INSTRUCTIONS
Antibiotic Information  -complete course of antibiotics as prescribed-not completing course of antibiotics may result in infection not fully eradicated or may lead to antibiotic resistance  -eat probiotic yogurt (Activia)  or take probiotic capsules i.e Align or Florastor (sprinkles are available for children)  -take antibiotics on full stomach unless noted otherwise   -Call or return to office if symptoms are not markedly improved within 3-4 days or if symptoms are worsening  -if on oral, patch or ring contraceptives, use back up birth control for 2 weeks.

## 2023-05-26 ENCOUNTER — EKG ENCOUNTER (OUTPATIENT)
Dept: LAB | Age: 28
End: 2023-05-26
Payer: MEDICAID

## 2023-05-26 ENCOUNTER — HOSPITAL ENCOUNTER (OUTPATIENT)
Dept: GENERAL RADIOLOGY | Age: 28
Discharge: HOME | End: 2023-05-26
Payer: MEDICAID

## 2023-05-26 ENCOUNTER — LAB ENCOUNTER (OUTPATIENT)
Dept: LAB | Age: 28
End: 2023-05-26
Payer: MEDICAID

## 2023-05-26 ENCOUNTER — TELEPHONE (OUTPATIENT)
Dept: FAMILY MEDICINE CLINIC | Facility: CLINIC | Age: 28
End: 2023-05-26

## 2023-05-26 ENCOUNTER — OFFICE VISIT (OUTPATIENT)
Dept: FAMILY MEDICINE CLINIC | Facility: CLINIC | Age: 28
End: 2023-05-26

## 2023-05-26 VITALS
TEMPERATURE: 97 F | DIASTOLIC BLOOD PRESSURE: 70 MMHG | WEIGHT: 172.19 LBS | HEART RATE: 109 BPM | BODY MASS INDEX: 32.51 KG/M2 | HEIGHT: 61 IN | SYSTOLIC BLOOD PRESSURE: 100 MMHG | OXYGEN SATURATION: 98 %

## 2023-05-26 DIAGNOSIS — J18.9 COMMUNITY ACQUIRED PNEUMONIA OF RIGHT LOWER LOBE OF LUNG: ICD-10-CM

## 2023-05-26 DIAGNOSIS — R11.0 NAUSEA: ICD-10-CM

## 2023-05-26 DIAGNOSIS — R19.7 DIARRHEA, UNSPECIFIED TYPE: ICD-10-CM

## 2023-05-26 DIAGNOSIS — R07.9 CHEST PAIN, UNSPECIFIED TYPE: ICD-10-CM

## 2023-05-26 DIAGNOSIS — J18.9 COMMUNITY ACQUIRED PNEUMONIA OF RIGHT LOWER LOBE OF LUNG: Primary | ICD-10-CM

## 2023-05-26 LAB
ALBUMIN SERPL-MCNC: 2.9 G/DL (ref 3.4–5)
ALBUMIN/GLOB SERPL: 0.5 {RATIO} (ref 1–2)
ALP LIVER SERPL-CCNC: 63 U/L
ALT SERPL-CCNC: 20 U/L
ANION GAP SERPL CALC-SCNC: 10 MMOL/L (ref 0–18)
AST SERPL-CCNC: 13 U/L (ref 15–37)
ATRIAL RATE: 78 BPM
BASOPHILS # BLD AUTO: 0.08 X10(3) UL (ref 0–0.2)
BASOPHILS NFR BLD AUTO: 0.5 %
BILIRUB SERPL-MCNC: 0.3 MG/DL (ref 0.1–2)
BUN BLD-MCNC: 8 MG/DL (ref 7–18)
BUN/CREAT SERPL: 8.5 (ref 10–20)
CALCIUM BLD-MCNC: 9.8 MG/DL (ref 8.5–10.1)
CHLORIDE SERPL-SCNC: 98 MMOL/L (ref 98–112)
CO2 SERPL-SCNC: 28 MMOL/L (ref 21–32)
CREAT BLD-MCNC: 0.94 MG/DL
DEPRECATED RDW RBC AUTO: 41.5 FL (ref 35.1–46.3)
EOSINOPHIL # BLD AUTO: 0.04 X10(3) UL (ref 0–0.7)
EOSINOPHIL NFR BLD AUTO: 0.3 %
ERYTHROCYTE [DISTWIDTH] IN BLOOD BY AUTOMATED COUNT: 12.4 % (ref 11–15)
FASTING STATUS PATIENT QL REPORTED: YES
GFR SERPLBLD BASED ON 1.73 SQ M-ARVRAT: 85 ML/MIN/1.73M2 (ref 60–?)
GLOBULIN PLAS-MCNC: 5.9 G/DL (ref 2.8–4.4)
GLUCOSE BLD-MCNC: 131 MG/DL (ref 70–99)
HCT VFR BLD AUTO: 40.3 %
HGB BLD-MCNC: 13.2 G/DL
IMM GRANULOCYTES # BLD AUTO: 0.07 X10(3) UL (ref 0–1)
IMM GRANULOCYTES NFR BLD: 0.4 %
LYMPHOCYTES # BLD AUTO: 1.37 X10(3) UL (ref 1–4)
LYMPHOCYTES NFR BLD AUTO: 8.6 %
MCH RBC QN AUTO: 29.7 PG (ref 26–34)
MCHC RBC AUTO-ENTMCNC: 32.8 G/DL (ref 31–37)
MCV RBC AUTO: 90.8 FL
MONOCYTES # BLD AUTO: 0.68 X10(3) UL (ref 0.1–1)
MONOCYTES NFR BLD AUTO: 4.3 %
NEUTROPHILS # BLD AUTO: 13.61 X10 (3) UL (ref 1.5–7.7)
NEUTROPHILS # BLD AUTO: 13.61 X10(3) UL (ref 1.5–7.7)
NEUTROPHILS NFR BLD AUTO: 85.9 %
OSMOLALITY SERPL CALC.SUM OF ELEC: 282 MOSM/KG (ref 275–295)
P AXIS: 6 DEGREES
P-R INTERVAL: 132 MS
PLATELET # BLD AUTO: 555 10(3)UL (ref 150–450)
POTASSIUM SERPL-SCNC: 3.4 MMOL/L (ref 3.5–5.1)
PROT SERPL-MCNC: 8.8 G/DL (ref 6.4–8.2)
Q-T INTERVAL: 364 MS
QRS DURATION: 70 MS
QTC CALCULATION (BEZET): 414 MS
R AXIS: -8 DEGREES
RBC # BLD AUTO: 4.44 X10(6)UL
SODIUM SERPL-SCNC: 136 MMOL/L (ref 136–145)
T AXIS: -4 DEGREES
VENTRICULAR RATE: 78 BPM
WBC # BLD AUTO: 15.9 X10(3) UL (ref 4–11)

## 2023-05-26 PROCEDURE — 93010 ELECTROCARDIOGRAM REPORT: CPT | Performed by: STUDENT IN AN ORGANIZED HEALTH CARE EDUCATION/TRAINING PROGRAM

## 2023-05-26 PROCEDURE — 85025 COMPLETE CBC W/AUTO DIFF WBC: CPT

## 2023-05-26 PROCEDURE — 99213 OFFICE O/P EST LOW 20 MIN: CPT

## 2023-05-26 PROCEDURE — 80053 COMPREHEN METABOLIC PANEL: CPT

## 2023-05-26 PROCEDURE — 71046 X-RAY EXAM CHEST 2 VIEWS: CPT

## 2023-05-26 PROCEDURE — 3008F BODY MASS INDEX DOCD: CPT

## 2023-05-26 PROCEDURE — 3078F DIAST BP <80 MM HG: CPT

## 2023-05-26 PROCEDURE — 36415 COLL VENOUS BLD VENIPUNCTURE: CPT

## 2023-05-26 PROCEDURE — 93005 ELECTROCARDIOGRAM TRACING: CPT

## 2023-05-26 PROCEDURE — 3074F SYST BP LT 130 MM HG: CPT

## 2023-05-26 RX ORDER — PREDNISONE 20 MG/1
TABLET ORAL
Qty: 15 TABLET | Refills: 0 | Status: ON HOLD | OUTPATIENT
Start: 2023-05-26 | End: 2023-06-01

## 2023-05-26 RX ORDER — LEVOFLOXACIN 750 MG/1
750 TABLET ORAL DAILY
Qty: 5 TABLET | Refills: 0 | Status: ON HOLD | OUTPATIENT
Start: 2023-05-26 | End: 2023-05-31

## 2023-05-26 RX ORDER — ALBUTEROL SULFATE 90 UG/1
2 AEROSOL, METERED RESPIRATORY (INHALATION) EVERY 4 HOURS PRN
Qty: 18 G | Refills: 0 | Status: ON HOLD | OUTPATIENT
Start: 2023-05-26

## 2023-05-26 RX ORDER — ONDANSETRON 4 MG/1
4 TABLET, ORALLY DISINTEGRATING ORAL EVERY 8 HOURS PRN
Qty: 15 TABLET | Refills: 0 | Status: ON HOLD | OUTPATIENT
Start: 2023-05-26

## 2023-05-26 NOTE — TELEPHONE ENCOUNTER
Received ON CALL page. Re: stat labs. Spoke to ordering provider Jacques Chery, who states she already discussed results with pt.

## 2023-05-27 ENCOUNTER — APPOINTMENT (OUTPATIENT)
Dept: ULTRASOUND IMAGING | Facility: HOSPITAL | Age: 28
End: 2023-05-27
Attending: HOSPITALIST
Payer: MEDICAID

## 2023-05-27 ENCOUNTER — HOSPITAL ENCOUNTER (INPATIENT)
Facility: HOSPITAL | Age: 28
LOS: 3 days | Discharge: HOME | End: 2023-05-30
Attending: EMERGENCY MEDICINE | Admitting: HOSPITALIST
Payer: MEDICAID

## 2023-05-27 ENCOUNTER — APPOINTMENT (OUTPATIENT)
Dept: CT IMAGING | Facility: HOSPITAL | Age: 28
End: 2023-05-27
Attending: EMERGENCY MEDICINE
Payer: MEDICAID

## 2023-05-27 DIAGNOSIS — R11.0 NAUSEA: ICD-10-CM

## 2023-05-27 DIAGNOSIS — J18.9 COMMUNITY ACQUIRED PNEUMONIA OF RIGHT LOWER LOBE OF LUNG: Primary | ICD-10-CM

## 2023-05-27 LAB
ALBUMIN SERPL-MCNC: 2.6 G/DL (ref 3.4–5)
ALBUMIN/GLOB SERPL: 0.4 {RATIO} (ref 1–2)
ALP LIVER SERPL-CCNC: 63 U/L
ALT SERPL-CCNC: 15 U/L
ANION GAP SERPL CALC-SCNC: 7 MMOL/L (ref 0–18)
AST SERPL-CCNC: 12 U/L (ref 15–37)
ATRIAL RATE: 72 BPM
B-HCG UR QL: NEGATIVE
BASOPHILS # BLD AUTO: 0.08 X10(3) UL (ref 0–0.2)
BASOPHILS NFR BLD AUTO: 0.6 %
BILIRUB SERPL-MCNC: 0.4 MG/DL (ref 0.1–2)
BUN BLD-MCNC: 5 MG/DL (ref 7–18)
BUN/CREAT SERPL: 5.3 (ref 10–20)
CALCIUM BLD-MCNC: 9.5 MG/DL (ref 8.5–10.1)
CHLORIDE SERPL-SCNC: 102 MMOL/L (ref 98–112)
CO2 SERPL-SCNC: 28 MMOL/L (ref 21–32)
CREAT BLD-MCNC: 0.94 MG/DL
CRP SERPL-MCNC: 10.5 MG/DL (ref ?–0.3)
DEPRECATED RDW RBC AUTO: 41.1 FL (ref 35.1–46.3)
EOSINOPHIL # BLD AUTO: 0.05 X10(3) UL (ref 0–0.7)
EOSINOPHIL NFR BLD AUTO: 0.4 %
ERYTHROCYTE [DISTWIDTH] IN BLOOD BY AUTOMATED COUNT: 12.6 % (ref 11–15)
GFR SERPLBLD BASED ON 1.73 SQ M-ARVRAT: 85 ML/MIN/1.73M2 (ref 60–?)
GLOBULIN PLAS-MCNC: 6.1 G/DL (ref 2.8–4.4)
GLUCOSE BLD-MCNC: 111 MG/DL (ref 70–99)
HCT VFR BLD AUTO: 38.3 %
HGB BLD-MCNC: 12.9 G/DL
IMM GRANULOCYTES # BLD AUTO: 0.06 X10(3) UL (ref 0–1)
IMM GRANULOCYTES NFR BLD: 0.4 %
LYMPHOCYTES # BLD AUTO: 0.84 X10(3) UL (ref 1–4)
LYMPHOCYTES NFR BLD AUTO: 6.1 %
MCH RBC QN AUTO: 30 PG (ref 26–34)
MCHC RBC AUTO-ENTMCNC: 33.7 G/DL (ref 31–37)
MCV RBC AUTO: 89.1 FL
MONOCYTES # BLD AUTO: 0.55 X10(3) UL (ref 0.1–1)
MONOCYTES NFR BLD AUTO: 4 %
MRSA DNA SPEC QL NAA+PROBE: NEGATIVE
NEUTROPHILS # BLD AUTO: 12.22 X10 (3) UL (ref 1.5–7.7)
NEUTROPHILS # BLD AUTO: 12.22 X10(3) UL (ref 1.5–7.7)
NEUTROPHILS NFR BLD AUTO: 88.5 %
OSMOLALITY SERPL CALC.SUM OF ELEC: 282 MOSM/KG (ref 275–295)
P AXIS: 4 DEGREES
P-R INTERVAL: 128 MS
PLATELET # BLD AUTO: 532 10(3)UL (ref 150–450)
POTASSIUM SERPL-SCNC: 3.5 MMOL/L (ref 3.5–5.1)
PROCALCITONIN SERPL-MCNC: 0.06 NG/ML (ref ?–0.16)
PROT SERPL-MCNC: 8.7 G/DL (ref 6.4–8.2)
Q-T INTERVAL: 398 MS
QRS DURATION: 72 MS
QTC CALCULATION (BEZET): 435 MS
R AXIS: -5 DEGREES
RBC # BLD AUTO: 4.3 X10(6)UL
SODIUM SERPL-SCNC: 137 MMOL/L (ref 136–145)
T AXIS: -3 DEGREES
VENTRICULAR RATE: 72 BPM
WBC # BLD AUTO: 13.8 X10(3) UL (ref 4–11)

## 2023-05-27 PROCEDURE — 99223 1ST HOSP IP/OBS HIGH 75: CPT | Performed by: INTERNAL MEDICINE

## 2023-05-27 PROCEDURE — 76775 US EXAM ABDO BACK WALL LIM: CPT | Performed by: HOSPITALIST

## 2023-05-27 PROCEDURE — 71260 CT THORAX DX C+: CPT | Performed by: EMERGENCY MEDICINE

## 2023-05-27 PROCEDURE — 99222 1ST HOSP IP/OBS MODERATE 55: CPT | Performed by: HOSPITALIST

## 2023-05-27 RX ORDER — VANCOMYCIN HYDROCHLORIDE
15 EVERY 12 HOURS
Status: DISCONTINUED | OUTPATIENT
Start: 2023-05-28 | End: 2023-05-30

## 2023-05-27 RX ORDER — IPRATROPIUM BROMIDE AND ALBUTEROL SULFATE 2.5; .5 MG/3ML; MG/3ML
3 SOLUTION RESPIRATORY (INHALATION)
Status: DISCONTINUED | OUTPATIENT
Start: 2023-05-27 | End: 2023-05-29

## 2023-05-27 RX ORDER — ENEMA 19; 7 G/133ML; G/133ML
1 ENEMA RECTAL ONCE AS NEEDED
Status: DISCONTINUED | OUTPATIENT
Start: 2023-05-27 | End: 2023-05-30

## 2023-05-27 RX ORDER — HYDROCODONE BITARTRATE AND ACETAMINOPHEN 5; 325 MG/1; MG/1
1 TABLET ORAL EVERY 4 HOURS PRN
Status: DISCONTINUED | OUTPATIENT
Start: 2023-05-27 | End: 2023-05-30

## 2023-05-27 RX ORDER — ECHINACEA PURPUREA EXTRACT 125 MG
1 TABLET ORAL
Status: DISCONTINUED | OUTPATIENT
Start: 2023-05-27 | End: 2023-05-30

## 2023-05-27 RX ORDER — IPRATROPIUM BROMIDE AND ALBUTEROL SULFATE 2.5; .5 MG/3ML; MG/3ML
3 SOLUTION RESPIRATORY (INHALATION) EVERY 4 HOURS PRN
Status: DISCONTINUED | OUTPATIENT
Start: 2023-05-27 | End: 2023-05-30

## 2023-05-27 RX ORDER — BISACODYL 10 MG
10 SUPPOSITORY, RECTAL RECTAL
Status: DISCONTINUED | OUTPATIENT
Start: 2023-05-27 | End: 2023-05-30

## 2023-05-27 RX ORDER — SODIUM CHLORIDE 9 MG/ML
INJECTION, SOLUTION INTRAVENOUS CONTINUOUS
Status: DISCONTINUED | OUTPATIENT
Start: 2023-05-27 | End: 2023-05-28

## 2023-05-27 RX ORDER — VANCOMYCIN HYDROCHLORIDE
15 ONCE
Status: COMPLETED | OUTPATIENT
Start: 2023-05-27 | End: 2023-05-27

## 2023-05-27 RX ORDER — ACETAMINOPHEN 500 MG
500 TABLET ORAL EVERY 4 HOURS PRN
Status: DISCONTINUED | OUTPATIENT
Start: 2023-05-27 | End: 2023-05-30

## 2023-05-27 RX ORDER — HYDROCODONE BITARTRATE AND ACETAMINOPHEN 5; 325 MG/1; MG/1
2 TABLET ORAL EVERY 4 HOURS PRN
Status: DISCONTINUED | OUTPATIENT
Start: 2023-05-27 | End: 2023-05-30

## 2023-05-27 RX ORDER — MELATONIN
3 NIGHTLY PRN
Status: DISCONTINUED | OUTPATIENT
Start: 2023-05-27 | End: 2023-05-30

## 2023-05-27 RX ORDER — HEPARIN SODIUM 5000 [USP'U]/ML
5000 INJECTION, SOLUTION INTRAVENOUS; SUBCUTANEOUS EVERY 8 HOURS SCHEDULED
Status: DISCONTINUED | OUTPATIENT
Start: 2023-05-27 | End: 2023-05-30

## 2023-05-27 RX ORDER — PROCHLORPERAZINE EDISYLATE 5 MG/ML
5 INJECTION INTRAMUSCULAR; INTRAVENOUS EVERY 8 HOURS PRN
Status: DISCONTINUED | OUTPATIENT
Start: 2023-05-27 | End: 2023-05-30

## 2023-05-27 RX ORDER — SENNOSIDES 8.6 MG
17.2 TABLET ORAL NIGHTLY PRN
Status: DISCONTINUED | OUTPATIENT
Start: 2023-05-27 | End: 2023-05-30

## 2023-05-27 RX ORDER — ACETAMINOPHEN 325 MG/1
650 TABLET ORAL EVERY 4 HOURS PRN
Status: DISCONTINUED | OUTPATIENT
Start: 2023-05-27 | End: 2023-05-30

## 2023-05-27 RX ORDER — SODIUM CHLORIDE 9 MG/ML
INJECTION, SOLUTION INTRAVENOUS CONTINUOUS
Status: DISCONTINUED | OUTPATIENT
Start: 2023-05-27 | End: 2023-05-27

## 2023-05-27 RX ORDER — ALBUTEROL SULFATE 90 UG/1
2 AEROSOL, METERED RESPIRATORY (INHALATION) 4 TIMES DAILY
Status: DISCONTINUED | OUTPATIENT
Start: 2023-05-27 | End: 2023-05-27

## 2023-05-27 RX ORDER — ONDANSETRON 2 MG/ML
4 INJECTION INTRAMUSCULAR; INTRAVENOUS EVERY 6 HOURS PRN
Status: DISCONTINUED | OUTPATIENT
Start: 2023-05-27 | End: 2023-05-30

## 2023-05-27 RX ORDER — POLYETHYLENE GLYCOL 3350 17 G/17G
17 POWDER, FOR SOLUTION ORAL DAILY PRN
Status: DISCONTINUED | OUTPATIENT
Start: 2023-05-27 | End: 2023-05-30

## 2023-05-27 NOTE — ED QUICK NOTES
Orders for admission, patient is aware of plan and ready to go upstairs. Any questions, please call ED RN Demetrius Palmer  at extension [phone].    Type of COVID test sent:None  COVID Suspicion level: Low    Titratable drug(s) infusing:  Rate:    LOC at time of transport:A&O x 4    Other pertinent information    CIWA score=  NIH score=

## 2023-05-27 NOTE — ED INITIAL ASSESSMENT (HPI)
Pt reports she was diagnosed with pneumonia three weeks ago and has been on four different abx without improvement of symptoms. Pt reports chest pressure to right lower chest. Pt reports productive cough of light green mucous. Pt denies fevers.

## 2023-05-27 NOTE — ED QUICK NOTES
Pt presents to the ED for eval of CP. Pt reports R anterior chest pressure x 2 days. States she noticed symptoms after starting new abx yesterday. Pt states she was diagnosed with pneumonia on 5/9/23 and since diagnosed she has had to change abx 4 times. +Dyspnea with exertion. +Chills. PCP advised pt if she begins to have CP with new abx to come to ER.  Here for further eval.

## 2023-05-28 LAB
ALBUMIN SERPL-MCNC: 2.4 G/DL (ref 3.4–5)
ALBUMIN/GLOB SERPL: 0.5 {RATIO} (ref 1–2)
ALP LIVER SERPL-CCNC: 53 U/L
ALT SERPL-CCNC: 13 U/L
ANION GAP SERPL CALC-SCNC: 6 MMOL/L (ref 0–18)
AST SERPL-CCNC: 9 U/L (ref 15–37)
BASOPHILS # BLD AUTO: 0.05 X10(3) UL (ref 0–0.2)
BASOPHILS NFR BLD AUTO: 0.4 %
BILIRUB SERPL-MCNC: 0.2 MG/DL (ref 0.1–2)
BUN BLD-MCNC: 6 MG/DL (ref 7–18)
BUN/CREAT SERPL: 7.3 (ref 10–20)
CALCIUM BLD-MCNC: 9.3 MG/DL (ref 8.5–10.1)
CHLORIDE SERPL-SCNC: 106 MMOL/L (ref 98–112)
CO2 SERPL-SCNC: 26 MMOL/L (ref 21–32)
CREAT BLD-MCNC: 0.82 MG/DL
DEPRECATED RDW RBC AUTO: 41.4 FL (ref 35.1–46.3)
EOSINOPHIL # BLD AUTO: 0.04 X10(3) UL (ref 0–0.7)
EOSINOPHIL NFR BLD AUTO: 0.3 %
ERYTHROCYTE [DISTWIDTH] IN BLOOD BY AUTOMATED COUNT: 12.5 % (ref 11–15)
GFR SERPLBLD BASED ON 1.73 SQ M-ARVRAT: 100 ML/MIN/1.73M2 (ref 60–?)
GLOBULIN PLAS-MCNC: 5.1 G/DL (ref 2.8–4.4)
GLUCOSE BLD-MCNC: 105 MG/DL (ref 70–99)
HCT VFR BLD AUTO: 35.2 %
HGB BLD-MCNC: 11.8 G/DL
IMM GRANULOCYTES # BLD AUTO: 0.07 X10(3) UL (ref 0–1)
IMM GRANULOCYTES NFR BLD: 0.5 %
LYMPHOCYTES # BLD AUTO: 1.34 X10(3) UL (ref 1–4)
LYMPHOCYTES NFR BLD AUTO: 10.1 %
MAGNESIUM SERPL-MCNC: 2.3 MG/DL (ref 1.6–2.6)
MCH RBC QN AUTO: 30 PG (ref 26–34)
MCHC RBC AUTO-ENTMCNC: 33.5 G/DL (ref 31–37)
MCV RBC AUTO: 89.6 FL
MONOCYTES # BLD AUTO: 0.84 X10(3) UL (ref 0.1–1)
MONOCYTES NFR BLD AUTO: 6.3 %
NEUTROPHILS # BLD AUTO: 10.99 X10 (3) UL (ref 1.5–7.7)
NEUTROPHILS # BLD AUTO: 10.99 X10(3) UL (ref 1.5–7.7)
NEUTROPHILS NFR BLD AUTO: 82.4 %
OSMOLALITY SERPL CALC.SUM OF ELEC: 284 MOSM/KG (ref 275–295)
PHOSPHATE SERPL-MCNC: 3.6 MG/DL (ref 2.5–4.9)
PLATELET # BLD AUTO: 495 10(3)UL (ref 150–450)
POTASSIUM SERPL-SCNC: 4 MMOL/L (ref 3.5–5.1)
PROT SERPL-MCNC: 7.5 G/DL (ref 6.4–8.2)
RBC # BLD AUTO: 3.93 X10(6)UL
SODIUM SERPL-SCNC: 138 MMOL/L (ref 136–145)
WBC # BLD AUTO: 13.3 X10(3) UL (ref 4–11)

## 2023-05-28 PROCEDURE — 99232 SBSQ HOSP IP/OBS MODERATE 35: CPT | Performed by: INTERNAL MEDICINE

## 2023-05-28 PROCEDURE — 99232 SBSQ HOSP IP/OBS MODERATE 35: CPT | Performed by: HOSPITALIST

## 2023-05-28 RX ORDER — ERTAPENEM 1 G/1
1 INJECTION, POWDER, LYOPHILIZED, FOR SOLUTION INTRAMUSCULAR; INTRAVENOUS DAILY
Qty: 14 EACH | Refills: 0 | Status: SHIPPED | OUTPATIENT
Start: 2023-05-28 | End: 2023-05-30

## 2023-05-28 RX ORDER — GUAIFENESIN 600 MG/1
600 TABLET, EXTENDED RELEASE ORAL 2 TIMES DAILY PRN
Status: DISCONTINUED | OUTPATIENT
Start: 2023-05-28 | End: 2023-05-30

## 2023-05-28 NOTE — PLAN OF CARE
Pt. is A/O * 4 and on RA. Monitoring vital signs- stable at this time. No acute changes noted throughout shift. Denies pain. Heparin for DVT prophylaxis. Independently ambulate. Encouraged frequent ambulation. Fall precautions maintained- bed locked in lowest position, call light and personal belongings within reach, non-skid socks in place to bilateral feet. Frequent rounding by nursing staff.     Problem: Patient Centered Care  Goal: Patient preferences are identified and integrated in the patient's plan of care  Description: Interventions:  - What would you like us to know as we care for you?   - Provide timely, complete, and accurate information to patient/family  - Incorporate patient and family knowledge, values, beliefs, and cultural backgrounds into the planning and delivery of care  - Encourage patient/family to participate in care and decision-making at the level they choose  - Honor patient and family perspectives and choices  Outcome: Progressing     Problem: Patient/Family Goals  Goal: Patient/Family Long Term Goal  Description: Patient's Long Term Goal:     Interventions:  -   - See additional Care Plan goals for specific interventions  Outcome: Progressing  Goal: Patient/Family Short Term Goal  Description: Patient's Short Term Goal:     Interventions:   -   - See additional Care Plan goals for specific interventions  Outcome: Progressing

## 2023-05-29 LAB
ANION GAP SERPL CALC-SCNC: 3 MMOL/L (ref 0–18)
BASOPHILS # BLD AUTO: 0.12 X10(3) UL (ref 0–0.2)
BASOPHILS NFR BLD AUTO: 0.9 %
BUN BLD-MCNC: 7 MG/DL (ref 7–18)
BUN/CREAT SERPL: 8 (ref 10–20)
CALCIUM BLD-MCNC: 9 MG/DL (ref 8.5–10.1)
CHLORIDE SERPL-SCNC: 108 MMOL/L (ref 98–112)
CO2 SERPL-SCNC: 28 MMOL/L (ref 21–32)
CREAT BLD-MCNC: 0.88 MG/DL
DEPRECATED RDW RBC AUTO: 42.5 FL (ref 35.1–46.3)
EOSINOPHIL # BLD AUTO: 0.22 X10(3) UL (ref 0–0.7)
EOSINOPHIL NFR BLD AUTO: 1.7 %
ERYTHROCYTE [DISTWIDTH] IN BLOOD BY AUTOMATED COUNT: 12.6 % (ref 11–15)
GFR SERPLBLD BASED ON 1.73 SQ M-ARVRAT: 92 ML/MIN/1.73M2 (ref 60–?)
GLUCOSE BLD-MCNC: 95 MG/DL (ref 70–99)
HCT VFR BLD AUTO: 36.5 %
HGB BLD-MCNC: 11.9 G/DL
IMM GRANULOCYTES # BLD AUTO: 0.05 X10(3) UL (ref 0–1)
IMM GRANULOCYTES NFR BLD: 0.4 %
LYMPHOCYTES # BLD AUTO: 2.65 X10(3) UL (ref 1–4)
LYMPHOCYTES NFR BLD AUTO: 20.8 %
M TB IFN-G CD4+ T-CELLS BLD-ACNC: 0.02 IU/ML
M TB TUBERC IFN-G BLD QL: NEGATIVE
M TB TUBERC IGNF/MITOGEN IGNF CONTROL: 0.63 IU/ML
MAGNESIUM SERPL-MCNC: 2 MG/DL (ref 1.6–2.6)
MCH RBC QN AUTO: 29.7 PG (ref 26–34)
MCHC RBC AUTO-ENTMCNC: 32.6 G/DL (ref 31–37)
MCV RBC AUTO: 91 FL
MONOCYTES # BLD AUTO: 0.94 X10(3) UL (ref 0.1–1)
MONOCYTES NFR BLD AUTO: 7.4 %
NEUTROPHILS # BLD AUTO: 8.74 X10 (3) UL (ref 1.5–7.7)
NEUTROPHILS # BLD AUTO: 8.74 X10(3) UL (ref 1.5–7.7)
NEUTROPHILS NFR BLD AUTO: 68.8 %
OSMOLALITY SERPL CALC.SUM OF ELEC: 286 MOSM/KG (ref 275–295)
PHOSPHATE SERPL-MCNC: 3.3 MG/DL (ref 2.5–4.9)
PLATELET # BLD AUTO: 453 10(3)UL (ref 150–450)
POTASSIUM SERPL-SCNC: 4 MMOL/L (ref 3.5–5.1)
QFT TB1 AG MINUS NIL: 0.02 IU/ML
QFT TB2 AG MINUS NIL: 0.02 IU/ML
RBC # BLD AUTO: 4.01 X10(6)UL
SODIUM SERPL-SCNC: 139 MMOL/L (ref 136–145)
WBC # BLD AUTO: 12.7 X10(3) UL (ref 4–11)

## 2023-05-29 PROCEDURE — 99232 SBSQ HOSP IP/OBS MODERATE 35: CPT | Performed by: HOSPITALIST

## 2023-05-29 PROCEDURE — 99233 SBSQ HOSP IP/OBS HIGH 50: CPT | Performed by: INTERNAL MEDICINE

## 2023-05-29 RX ORDER — IPRATROPIUM BROMIDE AND ALBUTEROL SULFATE 2.5; .5 MG/3ML; MG/3ML
3 SOLUTION RESPIRATORY (INHALATION)
Status: DISCONTINUED | OUTPATIENT
Start: 2023-05-29 | End: 2023-05-30

## 2023-05-29 NOTE — PLAN OF CARE
Problem: RESPIRATORY - ADULT  Goal: Achieves optimal ventilation and oxygenation  Description: INTERVENTIONS:  - Assess for changes in respiratory status  - Assess for changes in mentation and behavior  - Position to facilitate oxygenation and minimize respiratory effort  - Oxygen supplementation based on oxygen saturation or ABGs  - Provide Smoking Cessation handout, if applicable  - Encourage broncho-pulmonary hygiene including cough, deep breathe, Incentive Spirometry  - Assess the need for suctioning and perform as needed  - Assess and instruct to report SOB or any respiratory difficulty  - Respiratory Therapy support as indicated  - Manage/alleviate anxiety  - Monitor for signs/symptoms of CO2 retention  5/29/2023 0214 by Dmitry Aldana RN  Outcome: Progressing

## 2023-05-29 NOTE — PLAN OF CARE
Vital signs monitored per protocol with no acute changes at this time. IV abx infusing. Plan for PICC placement for discharge with outpt IV abx. Nausea managed with PRN antiemetic medications. Frequent rounding provided by nursing staff; safety precautions maintained. Problem: Patient Centered Care  Goal: Patient preferences are identified and integrated in the patient's plan of care  Description: Interventions:  - What would you like us to know as we care for you?  From home with daughter  - Provide timely, complete, and accurate information to patient/family  - Incorporate patient and family knowledge, values, beliefs, and cultural backgrounds into the planning and delivery of care  - Encourage patient/family to participate in care and decision-making at the level they choose  - Honor patient and family perspectives and choices  Outcome: Progressing     Problem: RESPIRATORY - ADULT  Goal: Achieves optimal ventilation and oxygenation  Description: INTERVENTIONS:  - Assess for changes in respiratory status  - Assess for changes in mentation and behavior  - Position to facilitate oxygenation and minimize respiratory effort  - Oxygen supplementation based on oxygen saturation or ABGs  - Provide Smoking Cessation handout, if applicable  - Encourage broncho-pulmonary hygiene including cough, deep breathe, Incentive Spirometry  - Assess the need for suctioning and perform as needed  - Assess and instruct to report SOB or any respiratory difficulty  - Respiratory Therapy support as indicated  - Manage/alleviate anxiety  - Monitor for signs/symptoms of CO2 retention  Outcome: Progressing

## 2023-05-29 NOTE — H&P
[facility]    PATIENT'S NAME: Adele Zimmerman   ATTENDING PHYSICIAN: Sharla Abrams MD   PATIENT ACCOUNT#:   [id number]    LOCATION:  [address] [place], [place] [address] \[facility] [place] [facility]\"" Aneesh Irene RECORD #:   [id number]       YOB: 1995  ADMISSION DATE:       05/27/2023    HISTORY AND PHYSICAL EXAMINATION    About 60 minutes were spent coordinating care with Dr. Henry Jean, reviewing CT images, interviewing patient, and otherwise admitting her to the hospital.    CHIEF COMPLAINT:  Increasing shortness of breath, not feeling any better. ADMITTING DIAGNOSIS:  Pneumonia with possible lung abscess. HISTORY OF PRESENT ILLNESS:  This is a very pleasant 71-year-old [facility] American female who presents with a 3-week history of being treated with various antibiotics for community-acquired pneumonia, 4 courses of Zithromax, etc.  She has a poor appetite since this incident started around May 9, since she was diagnosed with pneumonia on the right side on May 11. She came here to the hospital and Dr. Henry Jean performed a CAT scan and the CAT scan showed a mass-like consolidation in the right lower lobe with possible air, small gas-filled cavity suggestive of possible abscess. Also, her right kidney had poor visualization. She may have an abscess. She was admitted and placed on vancomycin and meropenem because she has hives from penicillin and will be admitted to the hospital.    PAST MEDICAL HISTORY:  Significant for asthma. MEDICATIONS:  Her medicine at home is Ventolin. ALLERGIES:  Penicillin causes her hives and nausea. FAMILY HISTORY:  Her father, she does not know. Her mother is 46 and has hypertension. SOCIAL HISTORY:  She smoked THC and a type of cigarette called black caps for 10 years. She quit right after this illness started on 05/10/2023. She does not drink. REVIEW OF SYSTEMS:  She has chest pain with coughing.   She denies being pregnant, and her urine pregnancy test was negative on May 11 and she denies that she has had intercourse since then. Otherwise, her 13 systems reviewed were negative. PHYSICAL EXAMINATION:    GENERAL:  Well-nourished, well-developed, friendly  female in mild distress. VITAL SIGNS:  Temperature is 93, pulse 70, respiratory rate 17, blood pressure 107/71, 99%. HEENT:  Normocephalic, atraumatic. Anicteric. Oropharynx moist.  NECK:  Supple. LYMPH NODES:  No submandibular lymphadenopathy. LUNGS:  Crackles, right base greater than left. HEART:  Normal S1, S2. No S3. No murmur. BACK:  No dorsal spine or CVA tenderness. ABDOMEN:  Soft and nontender x4. EXTREMITIES:  Without cyanosis or edema. She has a somewhat unusually formed fourth toe on both feet. VASCULAR:  Dorsalis pedis pulses bilaterally. SKIN:  No obvious rashes or tattoos. NEUROLOGIC:  Alert and oriented x3, friendly and cooperative. No apparent distress. PSYCHIATRIC:  She is friendly and appropriate, not particularly anxious. MUSCULOSKELETAL:  No joint deformities or injuries. LABORATORY DATA:  Sodium is 137, potassium 3.5, chloride 102, CO2 of 28, BUN 5, creatinine 0.94, albumin 2.6. White count 13,800, hemoglobin 12.9, platelets are 540,921. ASSESSMENT AND PLAN:    1. Pneumonia, longstanding, possible abscess. Will have Dr. Jesus Simon called. Will start on vancomycin and meropenem. Obtain blood cultures and see how she does. 2.   Asthma. Continue inhalers. 3.   Unusual finding on pole of the kidney. We will do an ultrasound of the kidney to see what that might reveal.  4.   Code status is Full Code by universal consent. Dictated By Kristian Maldonaod.  MD Aliza  d: 05/27/2023 16:32:19  t: 05/27/2023 18:53:44  Job [id number]  [facility]/

## 2023-05-29 NOTE — PLAN OF CARE
Plan for PICC for longterm ivabx     Problem: Patient Centered Care  Goal: Patient preferences are identified and integrated in the patient's plan of care  Description: Interventions:  - What would you like us to know as we care for you?   - Provide timely, complete, and accurate information to patient/family  - Incorporate patient and family knowledge, values, beliefs, and cultural backgrounds into the planning and delivery of care  - Encourage patient/family to participate in care and decision-making at the level they choose  - Honor patient and family perspectives and choices  Outcome: Progressing

## 2023-05-30 ENCOUNTER — APPOINTMENT (OUTPATIENT)
Dept: PICC SERVICES | Facility: HOSPITAL | Age: 28
End: 2023-05-30
Attending: HOSPITALIST
Payer: MEDICAID

## 2023-05-30 ENCOUNTER — APPOINTMENT (OUTPATIENT)
Dept: GENERAL RADIOLOGY | Facility: HOSPITAL | Age: 28
End: 2023-05-30
Attending: INTERNAL MEDICINE
Payer: MEDICAID

## 2023-05-30 VITALS
OXYGEN SATURATION: 99 % | HEART RATE: 89 BPM | TEMPERATURE: 98 F | WEIGHT: 176.5 LBS | RESPIRATION RATE: 18 BRPM | DIASTOLIC BLOOD PRESSURE: 79 MMHG | HEIGHT: 61 IN | BODY MASS INDEX: 33.32 KG/M2 | SYSTOLIC BLOOD PRESSURE: 107 MMHG

## 2023-05-30 PROBLEM — J85.1 ABSCESS OF LOWER LOBE OF RIGHT LUNG WITH PNEUMONIA (HCC): Status: ACTIVE | Noted: 2023-05-30

## 2023-05-30 LAB
ANION GAP SERPL CALC-SCNC: 2 MMOL/L (ref 0–18)
BASOPHILS # BLD AUTO: 0.09 X10(3) UL (ref 0–0.2)
BASOPHILS NFR BLD AUTO: 0.7 %
BUN BLD-MCNC: 8 MG/DL (ref 7–18)
BUN/CREAT SERPL: 10.3 (ref 10–20)
CALCIUM BLD-MCNC: 8.3 MG/DL (ref 8.5–10.1)
CHLORIDE SERPL-SCNC: 109 MMOL/L (ref 98–112)
CO2 SERPL-SCNC: 26 MMOL/L (ref 21–32)
CREAT BLD-MCNC: 0.78 MG/DL
DEPRECATED RDW RBC AUTO: 43 FL (ref 35.1–46.3)
EOSINOPHIL # BLD AUTO: 0.26 X10(3) UL (ref 0–0.7)
EOSINOPHIL NFR BLD AUTO: 2 %
ERYTHROCYTE [DISTWIDTH] IN BLOOD BY AUTOMATED COUNT: 13 % (ref 11–15)
GFR SERPLBLD BASED ON 1.73 SQ M-ARVRAT: 107 ML/MIN/1.73M2 (ref 60–?)
GLUCOSE BLD-MCNC: 109 MG/DL (ref 70–99)
HCT VFR BLD AUTO: 34.1 %
HGB BLD-MCNC: 11.2 G/DL
IMM GRANULOCYTES # BLD AUTO: 0.07 X10(3) UL (ref 0–1)
IMM GRANULOCYTES NFR BLD: 0.5 %
LYMPHOCYTES # BLD AUTO: 2.04 X10(3) UL (ref 1–4)
LYMPHOCYTES NFR BLD AUTO: 15.7 %
MAGNESIUM SERPL-MCNC: 2 MG/DL (ref 1.6–2.6)
MCH RBC QN AUTO: 29.6 PG (ref 26–34)
MCHC RBC AUTO-ENTMCNC: 32.8 G/DL (ref 31–37)
MCV RBC AUTO: 90.2 FL
MONOCYTES # BLD AUTO: 0.8 X10(3) UL (ref 0.1–1)
MONOCYTES NFR BLD AUTO: 6.2 %
NEUTROPHILS # BLD AUTO: 9.73 X10 (3) UL (ref 1.5–7.7)
NEUTROPHILS # BLD AUTO: 9.73 X10(3) UL (ref 1.5–7.7)
NEUTROPHILS NFR BLD AUTO: 74.9 %
OSMOLALITY SERPL CALC.SUM OF ELEC: 283 MOSM/KG (ref 275–295)
PHOSPHATE SERPL-MCNC: 3.2 MG/DL (ref 2.5–4.9)
PLATELET # BLD AUTO: 444 10(3)UL (ref 150–450)
POTASSIUM SERPL-SCNC: 3.8 MMOL/L (ref 3.5–5.1)
RBC # BLD AUTO: 3.78 X10(6)UL
SODIUM SERPL-SCNC: 137 MMOL/L (ref 136–145)
WBC # BLD AUTO: 13 X10(3) UL (ref 4–11)

## 2023-05-30 PROCEDURE — 99239 HOSP IP/OBS DSCHRG MGMT >30: CPT | Performed by: HOSPITALIST

## 2023-05-30 PROCEDURE — 02HV33Z INSERTION OF INFUSION DEVICE INTO SUPERIOR VENA CAVA, PERCUTANEOUS APPROACH: ICD-10-PCS | Performed by: HOSPITALIST

## 2023-05-30 PROCEDURE — 99232 SBSQ HOSP IP/OBS MODERATE 35: CPT | Performed by: INTERNAL MEDICINE

## 2023-05-30 PROCEDURE — 71045 X-RAY EXAM CHEST 1 VIEW: CPT | Performed by: INTERNAL MEDICINE

## 2023-05-30 RX ORDER — ERTAPENEM 1 G/1
1 INJECTION, POWDER, LYOPHILIZED, FOR SOLUTION INTRAMUSCULAR; INTRAVENOUS DAILY
Qty: 14 EACH | Refills: 0 | Status: SHIPPED | OUTPATIENT
Start: 2023-05-30

## 2023-05-30 RX ORDER — ONDANSETRON 4 MG/1
4 TABLET, ORALLY DISINTEGRATING ORAL EVERY 8 HOURS PRN
Qty: 15 TABLET | Refills: 0 | Status: SHIPPED | OUTPATIENT
Start: 2023-05-30

## 2023-05-30 RX ORDER — ACETAMINOPHEN 500 MG
500 TABLET ORAL EVERY 6 HOURS PRN
Qty: 1 TABLET | Refills: 0 | Status: SHIPPED | COMMUNITY
Start: 2023-05-30

## 2023-05-30 RX ORDER — HYDROCODONE BITARTRATE AND ACETAMINOPHEN 5; 325 MG/1; MG/1
1 TABLET ORAL EVERY 6 HOURS PRN
Qty: 12 TABLET | Refills: 0 | Status: SHIPPED | OUTPATIENT
Start: 2023-05-30 | End: 2023-06-05 | Stop reason: ALTCHOICE

## 2023-05-30 RX ORDER — GUAIFENESIN 600 MG/1
600 TABLET, EXTENDED RELEASE ORAL 2 TIMES DAILY PRN
Qty: 20 TABLET | Refills: 0 | Status: SHIPPED | OUTPATIENT
Start: 2023-05-30 | End: 2023-06-05 | Stop reason: ALTCHOICE

## 2023-05-30 RX ORDER — ERTAPENEM 1 G/1
1 INJECTION, POWDER, LYOPHILIZED, FOR SOLUTION INTRAMUSCULAR; INTRAVENOUS DAILY
Qty: 14 EACH | Refills: 0 | Status: SHIPPED | OUTPATIENT
Start: 2023-05-30 | End: 2023-05-30

## 2023-05-30 RX ORDER — LIDOCAINE HYDROCHLORIDE 10 MG/ML
5 INJECTION, SOLUTION EPIDURAL; INFILTRATION; INTRACAUDAL; PERINEURAL
Status: COMPLETED | OUTPATIENT
Start: 2023-05-30 | End: 2023-05-30

## 2023-05-30 NOTE — PLAN OF CARE
PRN pain meds given. Pt states she \" feels so much better since being admitted\" . Pt's sister delivered food- Pt tolerated takeout well. Pt nauseous in the morning prn antinausea meds given, prn mucinex given  Safety precautions in place, call light within reach. Problem: Patient Centered Care  Goal: Patient preferences are identified and integrated in the patient's plan of care  Description: Interventions:  - What would you like us to know as we care for you?  I live home with my daughter  - Provide timely, complete, and accurate information to patient/family  - Incorporate patient and family knowledge, values, beliefs, and cultural backgrounds into the planning and delivery of care  - Encourage patient/family to participate in care and decision-making at the level they choose  - Honor patient and family perspectives and choices  Outcome: Progressing     Problem: RESPIRATORY - ADULT  Goal: Achieves optimal ventilation and oxygenation  Description: INTERVENTIONS:  - Assess for changes in respiratory status  - Assess for changes in mentation and behavior  - Position to facilitate oxygenation and minimize respiratory effort  - Oxygen supplementation based on oxygen saturation or ABGs  - Provide Smoking Cessation handout, if applicable  - Encourage broncho-pulmonary hygiene including cough, deep breathe, Incentive Spirometry  - Assess the need for suctioning and perform as needed  - Assess and instruct to report SOB or any respiratory difficulty  - Respiratory Therapy support as indicated  - Manage/alleviate anxiety  - Monitor for signs/symptoms of CO2 retention  Outcome: Progressing

## 2023-05-30 NOTE — DISCHARGE SUMMARY
Dc summary#[id number]  > 30 min spent on [facility] Discharge Diagnoses: lung abscess    Lace+ Score: 26  59-90 High Risk  29-58 Medium Risk  0-28   Low Risk. TCM Follow-Up Recommendation:  LACE < 29: Low Risk of readmission after discharge from the hospital; Still recommend for TCM follow-up.

## 2023-05-30 NOTE — PLAN OF CARE
MDO placed for discharge today. Pt will discharge home with transportation provided by pt's grandmother. TONI PICC line will remain in place at discharge per MD order for outpatient IV abx infusions. First appointment tomorrow discussed with pt. AVS reviewed with patient; all questions answered at this time. All patient belongings sent with patient at time of discharge. Nurse  emptied. Problem: Patient Centered Care  Goal: Patient preferences are identified and integrated in the patient's plan of care  Description: Interventions:  - What would you like us to know as we care for you?  I live home with my daughter  - Provide timely, complete, and accurate information to patient/family  - Incorporate patient and family knowledge, values, beliefs, and cultural backgrounds into the planning and delivery of care  - Encourage patient/family to participate in care and decision-making at the level they choose  - Honor patient and family perspectives and choices  Outcome: Adequate for Discharge     Problem: RESPIRATORY - ADULT  Goal: Achieves optimal ventilation and oxygenation  Description: INTERVENTIONS:  - Assess for changes in respiratory status  - Assess for changes in mentation and behavior  - Position to facilitate oxygenation and minimize respiratory effort  - Oxygen supplementation based on oxygen saturation or ABGs  - Provide Smoking Cessation handout, if applicable  - Encourage broncho-pulmonary hygiene including cough, deep breathe, Incentive Spirometry  - Assess the need for suctioning and perform as needed  - Assess and instruct to report SOB or any respiratory difficulty  - Respiratory Therapy support as indicated  - Manage/alleviate anxiety  - Monitor for signs/symptoms of CO2 retention  Outcome: Adequate for Discharge

## 2023-05-30 NOTE — CM/SW NOTE
05/28/23 2256   Discharge disposition   Expected discharge disposition Home or Self   Outpatient services Infusion center  [facility])   Discharge transportation Private car     Pt discussed during nursing rounds. Pt is stable for dc today. MD dc order entered. Pt will require [place] IV abx at [place]. Pt has chosen to received outpatient infusion services for IV abx at [facility] with her 1st appointment scheduled tomorrow 5/31 at 2pm. Pt's grandmother will provide transport at [place]. Plan: Home w/[facility] for outpatient IV infusion services starting tomorrow. / to remain available for support and/or discharge planning.      KATHE Franz    [phone]

## 2023-05-30 NOTE — DISCHARGE SUMMARY
[facility]    PATIENT'S NAME: Isak Clark   ATTENDING PHYSICIAN: Sharla Abrams MD   PATIENT ACCOUNT#:   [id number]    LOCATION:  [address] [place] [address] Aneesh Irene RECORD #:   [id number]       YOB: 1995  ADMISSION DATE:       05/27/2023      DISCHARGE DATE:  05/30/2023    DISCHARGE SUMMARY      About 45 minutes were spent coordinating care with Infectious Disease, Social Work, and preparing this discharge. DISCHARGE DIAGNOSIS:  Lung abscess. HISTORY AND HOSPITAL COURSE:  This is a very pleasant, 45-year-old,  female who unfortunately has been vaping very heavily for a long period of time and smoking high-nicotine cigarettes. She presents with a history of pneumonia, not improving with multiple courses of p.o. antibiotics. She came into the emergency room and had a CT scan performed which showed that she had a lung abscess and we started her on vancomycin and meropenem. Fortunately, her blood cultures and sputum cultures were negative, and she was seen by Infectious Disease and changed to Sorin Running and will be discharged home with at least 2 weeks of Sorin Running and maybe converted to oral medication in the future or continue Invanz for 4 to 6 weeks. PICC line was placed. The patient had no difficulties and was discharged home. PHYSICAL EXAMINATION ON DISCHARGE:    VITAL SIGNS:  Temperature is 98.4, pulse is 84, respiratory rate is 18, blood pressure is 132/84, 97%. LUNGS:  Occasional rhonchi. HEART:  Normal S1, S2. No S3.  ABDOMEN:  Soft. EXTREMITIES:  Without edema. NEUROLOGIC:  Alert and oriented, friendly and cooperative. LABORATORY STUDIES:  Please see chart. ASSESSMENT AND PLAN:    1. Lung abscess. Continue Invanz for up to 6 weeks. Dr. Aurelio Phillips will see and will follow up for possible continuation of IV versus change to p.o. antibiotics at her discretion. 2.   Nicotine addiction through vaping. Patient states that she will quit. 3.   Asthma. Continue inhalers. CONDITION UPON DISCHARGE:  Stable. CODE STATUS:  Full Code by universal consent. ACTIVITY:  No heavy exercise. No work until cleared by primary. DIET:  As tolerated. FOLLOWUP:  With her primary APN, Kira Duff, in a few days for followup advice. Follow up with Dr. Lesa Prader in 2 weeks. Follow up with Dr. Sofie Tanner in 2 weeks. Return with fevers, chills, sweats, nausea, vomiting, chest pain, shortness of breath, or other complaints. MEDICATIONS ON DISCHARGE:    1.   Ventolin 2 puffs 4 times a day as needed. 2.   Zofran 4 mg every 8 hours as needed for nausea. 3.   Tylenol 500 mg every 6 hours as needed for pain. Watch total daily Tylenol, limit to 3 g.  4.   Mucinex  mg twice a day. 5.   Norco 5/325 one tablet every 6 hours as needed for severe pain when Tylenol is ineffective. 6.   Ertapenem 1 g IV daily, starting tomorrow for 14 days at least with perhaps extension by ID.  7.   Senna 17.2 mg daily. RISK OF RE-ADMISSION:  Low, but TCM followup is still recommended because of patient's severity of her illness. Dictated By Julio Romero.  MD Aliza  d: 05/30/2023 14:56:08  t: 05/30/2023 16:07:25  Job [id number]  ERIKA/

## 2023-05-31 ENCOUNTER — NURSE ONLY (OUTPATIENT)
Dept: HEMATOLOGY/ONCOLOGY | Facility: HOSPITAL | Age: 28
End: 2023-05-31
Attending: HOSPITALIST
Payer: MEDICAID

## 2023-05-31 ENCOUNTER — PATIENT OUTREACH (OUTPATIENT)
Dept: CASE MANAGEMENT | Age: 28
End: 2023-05-31

## 2023-05-31 VITALS
RESPIRATION RATE: 16 BRPM | HEART RATE: 98 BPM | DIASTOLIC BLOOD PRESSURE: 61 MMHG | WEIGHT: 175.81 LBS | OXYGEN SATURATION: 97 % | BODY MASS INDEX: 33 KG/M2 | SYSTOLIC BLOOD PRESSURE: 112 MMHG | TEMPERATURE: 99 F

## 2023-05-31 DIAGNOSIS — J85.1 ABSCESS OF LOWER LOBE OF RIGHT LUNG WITH PNEUMONIA (HCC): Primary | ICD-10-CM

## 2023-05-31 DIAGNOSIS — Z02.9 ENCOUNTERS FOR ADMINISTRATIVE PURPOSE: ICD-10-CM

## 2023-05-31 PROCEDURE — 96365 THER/PROPH/DIAG IV INF INIT: CPT

## 2023-05-31 RX ORDER — ERTAPENEM 1 G/1
INJECTION, POWDER, LYOPHILIZED, FOR SOLUTION INTRAMUSCULAR; INTRAVENOUS
Status: DISCONTINUED
Start: 2023-05-31 | End: 2023-05-31

## 2023-05-31 RX ORDER — 0.9 % SODIUM CHLORIDE 0.9 %
INTRAVENOUS SOLUTION INTRAVENOUS
Status: DISCONTINUED
Start: 2023-05-31 | End: 2023-05-31

## 2023-05-31 NOTE — PROGRESS NOTES
Patient to center for daily Ariana Giron for lung abcess      Arrives ambulatory , reports feeling well, denies any SOB       Review medication profile, schedule and weekend entrance    Patient advised to contact Dr Tawanda Jensen for follow up prior to last day  Supa verbalized understanding , phone number provided   PICC dressing C/D/I good blood return noted     Invanz given, tolerated well    discharged stable , AVS provided

## 2023-06-01 ENCOUNTER — NURSE ONLY (OUTPATIENT)
Dept: HEMATOLOGY/ONCOLOGY | Facility: HOSPITAL | Age: 28
End: 2023-06-01
Attending: HOSPITALIST
Payer: MEDICAID

## 2023-06-01 VITALS
SYSTOLIC BLOOD PRESSURE: 114 MMHG | HEART RATE: 105 BPM | OXYGEN SATURATION: 98 % | TEMPERATURE: 98 F | RESPIRATION RATE: 16 BRPM | DIASTOLIC BLOOD PRESSURE: 84 MMHG

## 2023-06-01 DIAGNOSIS — J85.1 ABSCESS OF LOWER LOBE OF RIGHT LUNG WITH PNEUMONIA (HCC): Primary | ICD-10-CM

## 2023-06-01 PROCEDURE — 96365 THER/PROPH/DIAG IV INF INIT: CPT

## 2023-06-01 RX ORDER — ERTAPENEM 1 G/1
INJECTION, POWDER, LYOPHILIZED, FOR SOLUTION INTRAMUSCULAR; INTRAVENOUS
Status: DISCONTINUED
Start: 2023-06-01 | End: 2023-06-01

## 2023-06-01 RX ORDER — 0.9 % SODIUM CHLORIDE 0.9 %
INTRAVENOUS SOLUTION INTRAVENOUS
Status: DISCONTINUED
Start: 2023-06-01 | End: 2023-06-01

## 2023-06-01 NOTE — PROGRESS NOTES
Pt arrived for daily Invanz for treatment of  lung abcess     Reports feeling well, denies any SOB, denies fever. Her left arm has some swelling posterior to PICC line. Located at site of old Lovenox injection. Bruising noted. Swelling is not noted to PICC line site. PICC line is flushing with positive blood return, no pain to PICC line. Encouraged her to call her doctor if the swelling got worse or if she developed pain, she voiced understanding. Dressing changed. Invanz given, tolerated well    Discharged ambulatory, aware of future appointments.

## 2023-06-02 ENCOUNTER — NURSE ONLY (OUTPATIENT)
Dept: HEMATOLOGY/ONCOLOGY | Facility: HOSPITAL | Age: 28
End: 2023-06-02
Attending: HOSPITALIST
Payer: MEDICAID

## 2023-06-02 VITALS
HEART RATE: 97 BPM | DIASTOLIC BLOOD PRESSURE: 80 MMHG | RESPIRATION RATE: 16 BRPM | SYSTOLIC BLOOD PRESSURE: 125 MMHG | OXYGEN SATURATION: 98 % | TEMPERATURE: 99 F

## 2023-06-02 DIAGNOSIS — J85.1 ABSCESS OF LOWER LOBE OF RIGHT LUNG WITH PNEUMONIA (HCC): Primary | ICD-10-CM

## 2023-06-02 LAB — BLASTOMYCES ABS QN DID: NEGATIVE

## 2023-06-02 PROCEDURE — 96365 THER/PROPH/DIAG IV INF INIT: CPT

## 2023-06-02 RX ORDER — ERTAPENEM 1 G/1
INJECTION, POWDER, LYOPHILIZED, FOR SOLUTION INTRAMUSCULAR; INTRAVENOUS
Status: DISCONTINUED
Start: 2023-06-02 | End: 2023-06-02

## 2023-06-02 RX ORDER — 0.9 % SODIUM CHLORIDE 0.9 %
INTRAVENOUS SOLUTION INTRAVENOUS
Status: DISCONTINUED
Start: 2023-06-02 | End: 2023-06-02

## 2023-06-02 NOTE — PROGRESS NOTES
Pt arrived for daily Invanz for treatment of  lung abcess    Reports feeling well, denies any SOB, denies fever. Pain to left arm has improved from yesterday. Invanz given, tolerated well    Discharged ambulatory, aware of future appointments.

## 2023-06-03 ENCOUNTER — NURSE ONLY (OUTPATIENT)
Dept: HEMATOLOGY/ONCOLOGY | Facility: HOSPITAL | Age: 28
End: 2023-06-03
Attending: HOSPITALIST
Payer: MEDICAID

## 2023-06-03 VITALS
SYSTOLIC BLOOD PRESSURE: 127 MMHG | HEART RATE: 110 BPM | RESPIRATION RATE: 16 BRPM | OXYGEN SATURATION: 97 % | DIASTOLIC BLOOD PRESSURE: 76 MMHG | TEMPERATURE: 98 F

## 2023-06-03 DIAGNOSIS — J85.1 ABSCESS OF LOWER LOBE OF RIGHT LUNG WITH PNEUMONIA (HCC): Primary | ICD-10-CM

## 2023-06-03 PROCEDURE — 96365 THER/PROPH/DIAG IV INF INIT: CPT

## 2023-06-03 NOTE — PROGRESS NOTES
Jad Holbrook is here for daily Ertapenem IV for a lung abscess. Supa tolerated infusion well and discharged stable. EOT-6/13    Jad Holbrook is aware that she needs to make a follow up appointment with ID and states that she will make an appointment.

## 2023-06-04 ENCOUNTER — NURSE ONLY (OUTPATIENT)
Dept: HEMATOLOGY/ONCOLOGY | Facility: HOSPITAL | Age: 28
End: 2023-06-04
Attending: HOSPITALIST
Payer: MEDICAID

## 2023-06-04 VITALS
RESPIRATION RATE: 16 BRPM | HEART RATE: 96 BPM | SYSTOLIC BLOOD PRESSURE: 129 MMHG | DIASTOLIC BLOOD PRESSURE: 81 MMHG | TEMPERATURE: 98 F | OXYGEN SATURATION: 97 %

## 2023-06-04 DIAGNOSIS — J85.1 ABSCESS OF LOWER LOBE OF RIGHT LUNG WITH PNEUMONIA (HCC): Primary | ICD-10-CM

## 2023-06-04 PROCEDURE — 96365 THER/PROPH/DIAG IV INF INIT: CPT

## 2023-06-04 RX ORDER — 0.9 % SODIUM CHLORIDE 0.9 %
INTRAVENOUS SOLUTION INTRAVENOUS
Status: DISCONTINUED
Start: 2023-06-04 | End: 2023-06-04

## 2023-06-04 RX ORDER — ERTAPENEM 1 G/1
INJECTION, POWDER, LYOPHILIZED, FOR SOLUTION INTRAMUSCULAR; INTRAVENOUS
Status: DISCONTINUED
Start: 2023-06-04 | End: 2023-06-04

## 2023-06-04 NOTE — PROGRESS NOTES
Sarahi Bravo is here for daily Ertapenem IV for a lung abscess. Supa tolerated infusion well and discharged stable. EOT-6/13    Sarahi Anderstadeo is aware that she needs to make a follow up appointment with ID and states that she will make an appointment.

## 2023-06-05 ENCOUNTER — NURSE ONLY (OUTPATIENT)
Dept: HEMATOLOGY/ONCOLOGY | Facility: HOSPITAL | Age: 28
End: 2023-06-05
Attending: HOSPITALIST
Payer: MEDICAID

## 2023-06-05 ENCOUNTER — OFFICE VISIT (OUTPATIENT)
Dept: FAMILY MEDICINE CLINIC | Facility: CLINIC | Age: 28
End: 2023-06-05

## 2023-06-05 VITALS
OXYGEN SATURATION: 100 % | HEART RATE: 86 BPM | DIASTOLIC BLOOD PRESSURE: 75 MMHG | SYSTOLIC BLOOD PRESSURE: 108 MMHG | TEMPERATURE: 98 F | HEIGHT: 61 IN | BODY MASS INDEX: 33.23 KG/M2 | RESPIRATION RATE: 20 BRPM | WEIGHT: 176 LBS

## 2023-06-05 DIAGNOSIS — R11.0 NAUSEA: ICD-10-CM

## 2023-06-05 DIAGNOSIS — J18.9 COMMUNITY ACQUIRED PNEUMONIA OF RIGHT LOWER LOBE OF LUNG: Primary | ICD-10-CM

## 2023-06-05 DIAGNOSIS — J85.1 ABSCESS OF LOWER LOBE OF RIGHT LUNG WITH PNEUMONIA (HCC): Primary | ICD-10-CM

## 2023-06-05 LAB
ALBUMIN SERPL-MCNC: 2.6 G/DL (ref 3.4–5)
ALBUMIN/GLOB SERPL: 0.5 {RATIO} (ref 1–2)
ALP LIVER SERPL-CCNC: 65 U/L
ALT SERPL-CCNC: 15 U/L
ANION GAP SERPL CALC-SCNC: 4 MMOL/L (ref 0–18)
AST SERPL-CCNC: 12 U/L (ref 15–37)
BASOPHILS # BLD AUTO: 0.09 X10(3) UL (ref 0–0.2)
BASOPHILS NFR BLD AUTO: 0.8 %
BILIRUB SERPL-MCNC: 0.3 MG/DL (ref 0.1–2)
BUN BLD-MCNC: 9 MG/DL (ref 7–18)
BUN/CREAT SERPL: 14.5 (ref 10–20)
CALCIUM BLD-MCNC: 8.9 MG/DL (ref 8.5–10.1)
CHLORIDE SERPL-SCNC: 109 MMOL/L (ref 98–112)
CO2 SERPL-SCNC: 25 MMOL/L (ref 21–32)
CREAT BLD-MCNC: 0.62 MG/DL
DEPRECATED RDW RBC AUTO: 44.1 FL (ref 35.1–46.3)
EOSINOPHIL # BLD AUTO: 0.49 X10(3) UL (ref 0–0.7)
EOSINOPHIL NFR BLD AUTO: 4.6 %
ERYTHROCYTE [DISTWIDTH] IN BLOOD BY AUTOMATED COUNT: 13.2 % (ref 11–15)
GFR SERPLBLD BASED ON 1.73 SQ M-ARVRAT: 125 ML/MIN/1.73M2 (ref 60–?)
GLOBULIN PLAS-MCNC: 4.8 G/DL (ref 2.8–4.4)
GLUCOSE BLD-MCNC: 106 MG/DL (ref 70–99)
HCT VFR BLD AUTO: 34.7 %
HGB BLD-MCNC: 11.5 G/DL
IMM GRANULOCYTES # BLD AUTO: 0.04 X10(3) UL (ref 0–1)
IMM GRANULOCYTES NFR BLD: 0.4 %
LYMPHOCYTES # BLD AUTO: 1.74 X10(3) UL (ref 1–4)
LYMPHOCYTES NFR BLD AUTO: 16.4 %
MCH RBC QN AUTO: 29.9 PG (ref 26–34)
MCHC RBC AUTO-ENTMCNC: 33.1 G/DL (ref 31–37)
MCV RBC AUTO: 90.4 FL
MONOCYTES # BLD AUTO: 0.63 X10(3) UL (ref 0.1–1)
MONOCYTES NFR BLD AUTO: 5.9 %
NEUTROPHILS # BLD AUTO: 7.64 X10 (3) UL (ref 1.5–7.7)
NEUTROPHILS # BLD AUTO: 7.64 X10(3) UL (ref 1.5–7.7)
NEUTROPHILS NFR BLD AUTO: 71.9 %
OSMOLALITY SERPL CALC.SUM OF ELEC: 285 MOSM/KG (ref 275–295)
PLATELET # BLD AUTO: 372 10(3)UL (ref 150–450)
POTASSIUM SERPL-SCNC: 3.6 MMOL/L (ref 3.5–5.1)
PROT SERPL-MCNC: 7.4 G/DL (ref 6.4–8.2)
RBC # BLD AUTO: 3.84 X10(6)UL
SODIUM SERPL-SCNC: 138 MMOL/L (ref 136–145)
UR GALACT AG SCR: <0.5
WBC # BLD AUTO: 10.6 X10(3) UL (ref 4–11)

## 2023-06-05 PROCEDURE — 3074F SYST BP LT 130 MM HG: CPT

## 2023-06-05 PROCEDURE — 80053 COMPREHEN METABOLIC PANEL: CPT

## 2023-06-05 PROCEDURE — 99213 OFFICE O/P EST LOW 20 MIN: CPT

## 2023-06-05 PROCEDURE — 3078F DIAST BP <80 MM HG: CPT

## 2023-06-05 PROCEDURE — 96365 THER/PROPH/DIAG IV INF INIT: CPT

## 2023-06-05 PROCEDURE — 85025 COMPLETE CBC W/AUTO DIFF WBC: CPT

## 2023-06-05 PROCEDURE — 3008F BODY MASS INDEX DOCD: CPT

## 2023-06-05 RX ORDER — 0.9 % SODIUM CHLORIDE 0.9 %
INTRAVENOUS SOLUTION INTRAVENOUS
Status: DISCONTINUED
Start: 2023-06-05 | End: 2023-06-05

## 2023-06-05 RX ORDER — ERTAPENEM 1 G/1
INJECTION, POWDER, LYOPHILIZED, FOR SOLUTION INTRAMUSCULAR; INTRAVENOUS
Status: DISCONTINUED
Start: 2023-06-05 | End: 2023-06-05

## 2023-06-06 ENCOUNTER — NURSE ONLY (OUTPATIENT)
Dept: HEMATOLOGY/ONCOLOGY | Facility: HOSPITAL | Age: 28
End: 2023-06-06
Attending: HOSPITALIST
Payer: MEDICAID

## 2023-06-06 VITALS
RESPIRATION RATE: 16 BRPM | DIASTOLIC BLOOD PRESSURE: 84 MMHG | SYSTOLIC BLOOD PRESSURE: 131 MMHG | OXYGEN SATURATION: 98 % | TEMPERATURE: 99 F | HEART RATE: 97 BPM

## 2023-06-06 DIAGNOSIS — J85.1 ABSCESS OF LOWER LOBE OF RIGHT LUNG WITH PNEUMONIA (HCC): Primary | ICD-10-CM

## 2023-06-06 PROCEDURE — 96365 THER/PROPH/DIAG IV INF INIT: CPT

## 2023-06-06 RX ORDER — ERTAPENEM 1 G/1
INJECTION, POWDER, LYOPHILIZED, FOR SOLUTION INTRAMUSCULAR; INTRAVENOUS
Status: DISCONTINUED
Start: 2023-06-06 | End: 2023-06-06

## 2023-06-06 RX ORDER — 0.9 % SODIUM CHLORIDE 0.9 %
INTRAVENOUS SOLUTION INTRAVENOUS
Status: DISCONTINUED
Start: 2023-06-06 | End: 2023-06-06

## 2023-06-06 NOTE — PROGRESS NOTES
Pt here for daily abx. Appeared to tolerate treatment. No S/S of adverse rxn noted at this time. Discharged home ambulating independently.       Last abx day: 6/13/23

## 2023-06-07 ENCOUNTER — NURSE ONLY (OUTPATIENT)
Dept: HEMATOLOGY/ONCOLOGY | Facility: HOSPITAL | Age: 28
End: 2023-06-07
Attending: HOSPITALIST
Payer: MEDICAID

## 2023-06-07 VITALS
OXYGEN SATURATION: 97 % | RESPIRATION RATE: 16 BRPM | SYSTOLIC BLOOD PRESSURE: 119 MMHG | DIASTOLIC BLOOD PRESSURE: 82 MMHG | HEART RATE: 88 BPM | TEMPERATURE: 98 F

## 2023-06-07 DIAGNOSIS — J85.1 ABSCESS OF LOWER LOBE OF RIGHT LUNG WITH PNEUMONIA (HCC): Primary | ICD-10-CM

## 2023-06-07 PROCEDURE — 96365 THER/PROPH/DIAG IV INF INIT: CPT

## 2023-06-07 RX ORDER — 0.9 % SODIUM CHLORIDE 0.9 %
INTRAVENOUS SOLUTION INTRAVENOUS
Status: DISCONTINUED
Start: 2023-06-07 | End: 2023-06-07

## 2023-06-07 RX ORDER — ERTAPENEM 1 G/1
INJECTION, POWDER, LYOPHILIZED, FOR SOLUTION INTRAMUSCULAR; INTRAVENOUS
Status: DISCONTINUED
Start: 2023-06-07 | End: 2023-06-07

## 2023-06-07 NOTE — PROGRESS NOTES
Pt here for daily abx. Pt states she is feeling well, offers no complaints at this time. Appeared to tolerate treatment. No S/S of adverse rxn noted at this time. Discharged home ambulating independently.       Last abx day: 6/13/23

## 2023-06-08 ENCOUNTER — OFFICE VISIT (OUTPATIENT)
Dept: CARDIOLOGY CLINIC | Facility: HOSPITAL | Age: 28
End: 2023-06-08
Attending: NURSE PRACTITIONER
Payer: MEDICAID

## 2023-06-08 ENCOUNTER — NURSE ONLY (OUTPATIENT)
Dept: HEMATOLOGY/ONCOLOGY | Facility: HOSPITAL | Age: 28
End: 2023-06-08
Attending: HOSPITALIST
Payer: MEDICAID

## 2023-06-08 VITALS
SYSTOLIC BLOOD PRESSURE: 134 MMHG | DIASTOLIC BLOOD PRESSURE: 77 MMHG | RESPIRATION RATE: 16 BRPM | HEART RATE: 77 BPM | OXYGEN SATURATION: 98 % | TEMPERATURE: 98 F

## 2023-06-08 VITALS
TEMPERATURE: 98 F | DIASTOLIC BLOOD PRESSURE: 75 MMHG | HEART RATE: 93 BPM | OXYGEN SATURATION: 97 % | SYSTOLIC BLOOD PRESSURE: 115 MMHG | BODY MASS INDEX: 33 KG/M2 | WEIGHT: 176.19 LBS

## 2023-06-08 DIAGNOSIS — J45.901 ASTHMA WITH ACUTE EXACERBATION, UNSPECIFIED ASTHMA SEVERITY, UNSPECIFIED WHETHER PERSISTENT: ICD-10-CM

## 2023-06-08 DIAGNOSIS — J18.9 COMMUNITY ACQUIRED PNEUMONIA OF RIGHT LOWER LOBE OF LUNG: Primary | ICD-10-CM

## 2023-06-08 DIAGNOSIS — J85.1 ABSCESS OF LOWER LOBE OF RIGHT LUNG WITH PNEUMONIA (HCC): ICD-10-CM

## 2023-06-08 DIAGNOSIS — F17.200 TOBACCO USE DISORDER: Chronic | ICD-10-CM

## 2023-06-08 DIAGNOSIS — J85.1 ABSCESS OF LOWER LOBE OF RIGHT LUNG WITH PNEUMONIA (HCC): Primary | ICD-10-CM

## 2023-06-08 PROCEDURE — 96365 THER/PROPH/DIAG IV INF INIT: CPT

## 2023-06-08 PROCEDURE — 99205 OFFICE O/P NEW HI 60 MIN: CPT | Performed by: NURSE PRACTITIONER

## 2023-06-08 RX ORDER — ERTAPENEM 1 G/1
INJECTION, POWDER, LYOPHILIZED, FOR SOLUTION INTRAMUSCULAR; INTRAVENOUS
Status: DISCONTINUED
Start: 2023-06-08 | End: 2023-06-08

## 2023-06-08 RX ORDER — 0.9 % SODIUM CHLORIDE 0.9 %
INTRAVENOUS SOLUTION INTRAVENOUS
Status: DISCONTINUED
Start: 2023-06-08 | End: 2023-06-08

## 2023-06-08 NOTE — PROGRESS NOTES
Patient arrives ambulating independently for daily antibiotics of INVANZ. LEFT PICC in place, positive blood returnl. Denies any complaints, tolerated infusion well, no s/s of adverse reaction.      EOT: 6/16

## 2023-06-08 NOTE — PATIENT INSTRUCTIONS
Continue all your medications as prescribed     Call if having shortness of breath, worsening cough cough, wheezing, chest pain, dizziness, lightheadedness, heart racing, palpitations, fatigue , weakness, fever or chills     Call 911 with severe shortness of breath, chest pain or chest pressure not improved after 15 minutes of rest or if feeling faint,  passing out or having a fall or coughing up blood     Heart healthy diet, Keep well-hydrated drinking between 2 and 3 L of fluid per day, 64 to 96 ounces per day    Follow up with Dr. Julissa Saldana, pulmonology in 2 weeks, call to make an appointment at [phone]    Follow up with the specialty care clinic in about 2 months unless you are seeing your primary care team or Dr. Lucas Valencia. Our office will call to make a follow-up appointment    Continue to stay quit from smoking, you can call the [facility] smoking cessation hotline, 866-GET-QUIT     Use the incentive spirometer taking 10 slow inhaled breaths followed by cough, 4 times a day.

## 2023-06-08 NOTE — PROGRESS NOTES
Subjective: Jania August reports still getting short of breath, mainly at night and uses inhaler when helps. Home equipment: oxygen no   IS no  acupella  no inhalers yes  Intervention:  6MW yes  Nebulizer treatment noLabs No    Last cigarette May 9. Does not plan to restart. Still has moist, productive cough. Still getting daily IV treatments thru picc line left upper arm. Patient and medication assessed. Discussed with APN. Extensive education of disease management including smoking cessation and incentive spirometer instructions. Reviewed AVS instructions. Patient verbalized understanding.

## 2023-06-09 ENCOUNTER — NURSE ONLY (OUTPATIENT)
Dept: HEMATOLOGY/ONCOLOGY | Facility: HOSPITAL | Age: 28
End: 2023-06-09
Attending: HOSPITALIST
Payer: MEDICAID

## 2023-06-09 ENCOUNTER — TELEPHONE (OUTPATIENT)
Dept: PULMONOLOGY | Facility: CLINIC | Age: 28
End: 2023-06-09

## 2023-06-09 VITALS
RESPIRATION RATE: 16 BRPM | TEMPERATURE: 99 F | DIASTOLIC BLOOD PRESSURE: 85 MMHG | HEART RATE: 78 BPM | SYSTOLIC BLOOD PRESSURE: 103 MMHG | OXYGEN SATURATION: 100 %

## 2023-06-09 DIAGNOSIS — J85.1 ABSCESS OF LOWER LOBE OF RIGHT LUNG WITH PNEUMONIA (HCC): Primary | ICD-10-CM

## 2023-06-09 PROCEDURE — 96365 THER/PROPH/DIAG IV INF INIT: CPT

## 2023-06-09 RX ORDER — ERTAPENEM 1 G/1
INJECTION, POWDER, LYOPHILIZED, FOR SOLUTION INTRAMUSCULAR; INTRAVENOUS
Status: DISCONTINUED
Start: 2023-06-09 | End: 2023-06-09

## 2023-06-09 RX ORDER — 0.9 % SODIUM CHLORIDE 0.9 %
INTRAVENOUS SOLUTION INTRAVENOUS
Status: DISCONTINUED
Start: 2023-06-09 | End: 2023-06-09

## 2023-06-09 NOTE — TELEPHONE ENCOUNTER
----- Message from Art Pappas NP sent at 6/8/2023  3:56 PM CDT -----  Regarding: hospital follow up appointment  Hi,  Can you help Ashlyn Rebecca make a hospital follow-up appointment to see Dr. Naman Jiménez after severe right lower lobe pneumonia with abscess. She is on IV antibiotics through June 16. She will need to see Dr. Naman Jiménez in about 2 weeks.   Thank you,  Aggie HERNANDEZ, 6/8/2023

## 2023-06-09 NOTE — TELEPHONE ENCOUNTER
Pt seen in consult while inpt by Dr. Lisset Ornelas on 5/27. Unable to reach pt/leave msg. Will attempt later. Scheduled pt for an appt w/ MD on 6/28 4:45 pm [facility]. Dr. Lisset Ornelas- see below, appt ok?

## 2023-06-09 NOTE — PROGRESS NOTES
Pt here for daily abx. Ordered for lung abscess  Appeared to tolerate treatment. No S/S of adverse rxn noted at this time.   Discharged       EOT 6/16 Friday  Ct scan 6/19 monday

## 2023-06-10 ENCOUNTER — NURSE ONLY (OUTPATIENT)
Dept: HEMATOLOGY/ONCOLOGY | Facility: HOSPITAL | Age: 28
End: 2023-06-10
Attending: HOSPITALIST
Payer: MEDICAID

## 2023-06-10 VITALS
SYSTOLIC BLOOD PRESSURE: 123 MMHG | RESPIRATION RATE: 16 BRPM | HEART RATE: 84 BPM | OXYGEN SATURATION: 96 % | DIASTOLIC BLOOD PRESSURE: 85 MMHG | TEMPERATURE: 99 F

## 2023-06-10 DIAGNOSIS — J85.1 ABSCESS OF LOWER LOBE OF RIGHT LUNG WITH PNEUMONIA (HCC): Primary | ICD-10-CM

## 2023-06-10 PROCEDURE — 96365 THER/PROPH/DIAG IV INF INIT: CPT

## 2023-06-10 RX ORDER — ERTAPENEM 1 G/1
INJECTION, POWDER, LYOPHILIZED, FOR SOLUTION INTRAMUSCULAR; INTRAVENOUS
Status: DISCONTINUED
Start: 2023-06-10 | End: 2023-06-10

## 2023-06-10 RX ORDER — 0.9 % SODIUM CHLORIDE 0.9 %
INTRAVENOUS SOLUTION INTRAVENOUS
Status: DISCONTINUED
Start: 2023-06-10 | End: 2023-06-10

## 2023-06-11 ENCOUNTER — NURSE ONLY (OUTPATIENT)
Dept: HEMATOLOGY/ONCOLOGY | Facility: HOSPITAL | Age: 28
End: 2023-06-11
Attending: HOSPITALIST
Payer: MEDICAID

## 2023-06-11 VITALS
OXYGEN SATURATION: 97 % | DIASTOLIC BLOOD PRESSURE: 69 MMHG | SYSTOLIC BLOOD PRESSURE: 116 MMHG | HEART RATE: 83 BPM | RESPIRATION RATE: 16 BRPM | TEMPERATURE: 98 F

## 2023-06-11 DIAGNOSIS — J85.1 ABSCESS OF LOWER LOBE OF RIGHT LUNG WITH PNEUMONIA (HCC): Primary | ICD-10-CM

## 2023-06-11 PROCEDURE — 96365 THER/PROPH/DIAG IV INF INIT: CPT

## 2023-06-11 RX ORDER — 0.9 % SODIUM CHLORIDE 0.9 %
INTRAVENOUS SOLUTION INTRAVENOUS
Status: DISCONTINUED
Start: 2023-06-11 | End: 2023-06-11

## 2023-06-11 RX ORDER — ERTAPENEM 1 G/1
INJECTION, POWDER, LYOPHILIZED, FOR SOLUTION INTRAMUSCULAR; INTRAVENOUS
Status: DISCONTINUED
Start: 2023-06-11 | End: 2023-06-11

## 2023-06-12 ENCOUNTER — NURSE ONLY (OUTPATIENT)
Dept: HEMATOLOGY/ONCOLOGY | Facility: HOSPITAL | Age: 28
End: 2023-06-12
Attending: INTERNAL MEDICINE
Payer: MEDICAID

## 2023-06-12 VITALS
OXYGEN SATURATION: 100 % | RESPIRATION RATE: 16 BRPM | SYSTOLIC BLOOD PRESSURE: 116 MMHG | TEMPERATURE: 99 F | HEART RATE: 72 BPM | DIASTOLIC BLOOD PRESSURE: 67 MMHG

## 2023-06-12 DIAGNOSIS — J85.1 ABSCESS OF LOWER LOBE OF RIGHT LUNG WITH PNEUMONIA (HCC): Primary | ICD-10-CM

## 2023-06-12 LAB
ALBUMIN SERPL-MCNC: 2.8 G/DL (ref 3.4–5)
ALBUMIN/GLOB SERPL: 0.6 {RATIO} (ref 1–2)
ALP LIVER SERPL-CCNC: 57 U/L
ALT SERPL-CCNC: 10 U/L
ANION GAP SERPL CALC-SCNC: 6 MMOL/L (ref 0–18)
AST SERPL-CCNC: 7 U/L (ref 15–37)
BASOPHILS # BLD AUTO: 0.08 X10(3) UL (ref 0–0.2)
BASOPHILS NFR BLD AUTO: 0.8 %
BILIRUB SERPL-MCNC: 0.2 MG/DL (ref 0.1–2)
BUN BLD-MCNC: 7 MG/DL (ref 7–18)
BUN/CREAT SERPL: 12.5 (ref 10–20)
CALCIUM BLD-MCNC: 8.8 MG/DL (ref 8.5–10.1)
CHLORIDE SERPL-SCNC: 110 MMOL/L (ref 98–112)
CO2 SERPL-SCNC: 24 MMOL/L (ref 21–32)
CREAT BLD-MCNC: 0.56 MG/DL
DEPRECATED RDW RBC AUTO: 44.5 FL (ref 35.1–46.3)
EOSINOPHIL # BLD AUTO: 0.21 X10(3) UL (ref 0–0.7)
EOSINOPHIL NFR BLD AUTO: 2.2 %
ERYTHROCYTE [DISTWIDTH] IN BLOOD BY AUTOMATED COUNT: 13.3 % (ref 11–15)
GFR SERPLBLD BASED ON 1.73 SQ M-ARVRAT: 128 ML/MIN/1.73M2 (ref 60–?)
GLOBULIN PLAS-MCNC: 4.8 G/DL (ref 2.8–4.4)
GLUCOSE BLD-MCNC: 129 MG/DL (ref 70–99)
HCT VFR BLD AUTO: 34 %
HGB BLD-MCNC: 11.1 G/DL
IMM GRANULOCYTES # BLD AUTO: 0.03 X10(3) UL (ref 0–1)
IMM GRANULOCYTES NFR BLD: 0.3 %
LYMPHOCYTES # BLD AUTO: 2.14 X10(3) UL (ref 1–4)
LYMPHOCYTES NFR BLD AUTO: 22.7 %
MCH RBC QN AUTO: 29.4 PG (ref 26–34)
MCHC RBC AUTO-ENTMCNC: 32.6 G/DL (ref 31–37)
MCV RBC AUTO: 90.2 FL
MONOCYTES # BLD AUTO: 0.5 X10(3) UL (ref 0.1–1)
MONOCYTES NFR BLD AUTO: 5.3 %
NEUTROPHILS # BLD AUTO: 6.48 X10 (3) UL (ref 1.5–7.7)
NEUTROPHILS # BLD AUTO: 6.48 X10(3) UL (ref 1.5–7.7)
NEUTROPHILS NFR BLD AUTO: 68.7 %
OSMOLALITY SERPL CALC.SUM OF ELEC: 290 MOSM/KG (ref 275–295)
PLATELET # BLD AUTO: 376 10(3)UL (ref 150–450)
POTASSIUM SERPL-SCNC: 3.3 MMOL/L (ref 3.5–5.1)
PROT SERPL-MCNC: 7.6 G/DL (ref 6.4–8.2)
RBC # BLD AUTO: 3.77 X10(6)UL
SODIUM SERPL-SCNC: 140 MMOL/L (ref 136–145)
WBC # BLD AUTO: 9.4 X10(3) UL (ref 4–11)

## 2023-06-12 PROCEDURE — 80053 COMPREHEN METABOLIC PANEL: CPT

## 2023-06-12 PROCEDURE — 96365 THER/PROPH/DIAG IV INF INIT: CPT

## 2023-06-12 PROCEDURE — 85025 COMPLETE CBC W/AUTO DIFF WBC: CPT

## 2023-06-12 RX ORDER — ERTAPENEM 1 G/1
INJECTION, POWDER, LYOPHILIZED, FOR SOLUTION INTRAMUSCULAR; INTRAVENOUS
Status: DISCONTINUED
Start: 2023-06-12 | End: 2023-06-12

## 2023-06-12 RX ORDER — 0.9 % SODIUM CHLORIDE 0.9 %
INTRAVENOUS SOLUTION INTRAVENOUS
Status: DISCONTINUED
Start: 2023-06-12 | End: 2023-06-12

## 2023-06-12 NOTE — TELEPHONE ENCOUNTER
Santana Clark MD  You 3 days ago     ok      Pt accepted appt w/ Dr. Jose Atkins on 6/28 4:45 pm [facility]. Appt info given. She voiced understanding.

## 2023-06-12 NOTE — PROGRESS NOTES
Pt here for daily abx. Antibiotics extended through MDV on 6/22. Appeared to tolerate treatment. No S/S of adverse rxn noted at this time. Discharged home ambulating independently.     FU: Dr Fabiana Larios on 6/22  Last abx day: 6/22

## 2023-06-13 ENCOUNTER — NURSE ONLY (OUTPATIENT)
Dept: HEMATOLOGY/ONCOLOGY | Facility: HOSPITAL | Age: 28
End: 2023-06-13
Attending: INTERNAL MEDICINE
Payer: MEDICAID

## 2023-06-13 VITALS
SYSTOLIC BLOOD PRESSURE: 105 MMHG | RESPIRATION RATE: 16 BRPM | DIASTOLIC BLOOD PRESSURE: 62 MMHG | OXYGEN SATURATION: 98 % | TEMPERATURE: 99 F | HEART RATE: 75 BPM

## 2023-06-13 DIAGNOSIS — J85.1 ABSCESS OF LOWER LOBE OF RIGHT LUNG WITH PNEUMONIA (HCC): Primary | ICD-10-CM

## 2023-06-13 PROCEDURE — 96365 THER/PROPH/DIAG IV INF INIT: CPT

## 2023-06-13 RX ORDER — 0.9 % SODIUM CHLORIDE 0.9 %
INTRAVENOUS SOLUTION INTRAVENOUS
Status: DISCONTINUED
Start: 2023-06-13 | End: 2023-06-13

## 2023-06-13 RX ORDER — ERTAPENEM 1 G/1
INJECTION, POWDER, LYOPHILIZED, FOR SOLUTION INTRAMUSCULAR; INTRAVENOUS
Status: DISCONTINUED
Start: 2023-06-13 | End: 2023-06-13

## 2023-06-14 ENCOUNTER — APPOINTMENT (OUTPATIENT)
Dept: HEMATOLOGY/ONCOLOGY | Facility: HOSPITAL | Age: 28
End: 2023-06-14
Payer: MEDICAID

## 2023-06-14 ENCOUNTER — APPOINTMENT (OUTPATIENT)
Dept: HEMATOLOGY/ONCOLOGY | Facility: HOSPITAL | Age: 28
End: 2023-06-14
Attending: STUDENT IN AN ORGANIZED HEALTH CARE EDUCATION/TRAINING PROGRAM
Payer: MEDICAID

## 2023-06-15 ENCOUNTER — APPOINTMENT (OUTPATIENT)
Dept: HEMATOLOGY/ONCOLOGY | Facility: HOSPITAL | Age: 28
End: 2023-06-15
Payer: MEDICAID

## 2023-06-15 ENCOUNTER — NURSE ONLY (OUTPATIENT)
Dept: HEMATOLOGY/ONCOLOGY | Facility: HOSPITAL | Age: 28
End: 2023-06-15
Attending: STUDENT IN AN ORGANIZED HEALTH CARE EDUCATION/TRAINING PROGRAM
Payer: MEDICAID

## 2023-06-15 VITALS
TEMPERATURE: 99 F | RESPIRATION RATE: 16 BRPM | SYSTOLIC BLOOD PRESSURE: 106 MMHG | HEART RATE: 98 BPM | OXYGEN SATURATION: 97 % | DIASTOLIC BLOOD PRESSURE: 71 MMHG

## 2023-06-15 DIAGNOSIS — J85.1 ABSCESS OF LOWER LOBE OF RIGHT LUNG WITH PNEUMONIA (HCC): Primary | ICD-10-CM

## 2023-06-15 PROCEDURE — 96365 THER/PROPH/DIAG IV INF INIT: CPT

## 2023-06-15 RX ORDER — 0.9 % SODIUM CHLORIDE 0.9 %
INTRAVENOUS SOLUTION INTRAVENOUS
Status: DISCONTINUED
Start: 2023-06-15 | End: 2023-06-15

## 2023-06-15 RX ORDER — ERTAPENEM 1 G/1
INJECTION, POWDER, LYOPHILIZED, FOR SOLUTION INTRAMUSCULAR; INTRAVENOUS
Status: DISCONTINUED
Start: 2023-06-15 | End: 2023-06-15

## 2023-06-15 NOTE — PROGRESS NOTES
Pt here for a daily invanz infusion. Pt denies any issues or concerns. Ordering MD: Dr. Funmilayo Ashton  Order Exp: 6/22/23     Pt tolerated infusion without difficulty or complaint. Reviewed next apt date/time.       Education Record    Learner:  Patient    Disease / Diagnosis: Abscess of lower lobe of right lung with pneumonia    Barriers / Limitations:  None   Comments:    Method:  Discussion   Comments:    General Topics:  Plan of care reviewed   Comments:    Outcome:  Shows understanding   Comments:

## 2023-06-16 ENCOUNTER — APPOINTMENT (OUTPATIENT)
Dept: HEMATOLOGY/ONCOLOGY | Facility: HOSPITAL | Age: 28
End: 2023-06-16
Payer: MEDICAID

## 2023-06-16 ENCOUNTER — NURSE ONLY (OUTPATIENT)
Dept: HEMATOLOGY/ONCOLOGY | Facility: HOSPITAL | Age: 28
End: 2023-06-16
Attending: STUDENT IN AN ORGANIZED HEALTH CARE EDUCATION/TRAINING PROGRAM
Payer: MEDICAID

## 2023-06-16 VITALS
TEMPERATURE: 99 F | DIASTOLIC BLOOD PRESSURE: 67 MMHG | HEART RATE: 67 BPM | SYSTOLIC BLOOD PRESSURE: 123 MMHG | RESPIRATION RATE: 16 BRPM | OXYGEN SATURATION: 100 %

## 2023-06-16 DIAGNOSIS — J85.1 ABSCESS OF LOWER LOBE OF RIGHT LUNG WITH PNEUMONIA (HCC): Primary | ICD-10-CM

## 2023-06-16 PROCEDURE — 96365 THER/PROPH/DIAG IV INF INIT: CPT

## 2023-06-16 RX ORDER — 0.9 % SODIUM CHLORIDE 0.9 %
INTRAVENOUS SOLUTION INTRAVENOUS
Status: DISCONTINUED
Start: 2023-06-16 | End: 2023-06-16

## 2023-06-16 RX ORDER — ERTAPENEM 1 G/1
INJECTION, POWDER, LYOPHILIZED, FOR SOLUTION INTRAMUSCULAR; INTRAVENOUS
Status: DISCONTINUED
Start: 2023-06-16 | End: 2023-06-16

## 2023-06-16 NOTE — PROGRESS NOTES
Pt here for a daily invanz infusion. Pt denies any issues or concerns. Ordering MD: Dr. Jake Castanon  Order Exp: 6/22/23     Pt tolerated infusion without difficulty or complaint. Reviewed next apt date/time.       Education Record    Learner:  Patient    Disease / Mary Mass of lower lobe of right lung with pneumonia    Barriers / Limitations:  None   Comments:    Method:  Discussion   Comments:    General Topics:  Plan of care reviewed   Comments:    Outcome:  Shows understanding   Comments:

## 2023-06-17 ENCOUNTER — NURSE ONLY (OUTPATIENT)
Dept: HEMATOLOGY/ONCOLOGY | Facility: HOSPITAL | Age: 28
End: 2023-06-17
Attending: HOSPITALIST
Payer: MEDICAID

## 2023-06-17 VITALS
DIASTOLIC BLOOD PRESSURE: 84 MMHG | TEMPERATURE: 98 F | SYSTOLIC BLOOD PRESSURE: 119 MMHG | RESPIRATION RATE: 16 BRPM | HEART RATE: 75 BPM | OXYGEN SATURATION: 98 %

## 2023-06-17 DIAGNOSIS — J85.1 ABSCESS OF LOWER LOBE OF RIGHT LUNG WITH PNEUMONIA (HCC): Primary | ICD-10-CM

## 2023-06-17 PROCEDURE — 96365 THER/PROPH/DIAG IV INF INIT: CPT

## 2023-06-17 RX ORDER — 0.9 % SODIUM CHLORIDE 0.9 %
INTRAVENOUS SOLUTION INTRAVENOUS
Status: DISCONTINUED
Start: 2023-06-17 | End: 2023-06-17

## 2023-06-17 RX ORDER — ERTAPENEM 1 G/1
INJECTION, POWDER, LYOPHILIZED, FOR SOLUTION INTRAMUSCULAR; INTRAVENOUS
Status: DISCONTINUED
Start: 2023-06-17 | End: 2023-06-17

## 2023-06-17 NOTE — PROGRESS NOTES
Patient arrives ambulating independently for daily antibiotics of INVANZ. LEFT PICC in place, positive blood return. Denies any complaints, tolerated infusion well, no s/s of adverse reaction.      EOT: 6/22

## 2023-06-18 ENCOUNTER — NURSE ONLY (OUTPATIENT)
Dept: HEMATOLOGY/ONCOLOGY | Facility: HOSPITAL | Age: 28
End: 2023-06-18
Attending: STUDENT IN AN ORGANIZED HEALTH CARE EDUCATION/TRAINING PROGRAM
Payer: MEDICAID

## 2023-06-18 VITALS
DIASTOLIC BLOOD PRESSURE: 66 MMHG | TEMPERATURE: 98 F | HEART RATE: 68 BPM | SYSTOLIC BLOOD PRESSURE: 123 MMHG | OXYGEN SATURATION: 98 % | RESPIRATION RATE: 16 BRPM

## 2023-06-18 DIAGNOSIS — J85.1 ABSCESS OF LOWER LOBE OF RIGHT LUNG WITH PNEUMONIA (HCC): Primary | ICD-10-CM

## 2023-06-18 PROCEDURE — 96365 THER/PROPH/DIAG IV INF INIT: CPT

## 2023-06-18 RX ORDER — ERTAPENEM 1 G/1
INJECTION, POWDER, LYOPHILIZED, FOR SOLUTION INTRAMUSCULAR; INTRAVENOUS
Status: DISCONTINUED
Start: 2023-06-18 | End: 2023-06-18

## 2023-06-18 RX ORDER — 0.9 % SODIUM CHLORIDE 0.9 %
INTRAVENOUS SOLUTION INTRAVENOUS
Status: DISCONTINUED
Start: 2023-06-18 | End: 2023-06-18

## 2023-06-18 NOTE — PROGRESS NOTES
Pt to infusion for daily abx. Arrives ambulating independently accompanied by self. Invanz infused per order, pt appeared to tolerate well. PICC flushed and capped. Discharged to home ambulating independently.     EOT 6/22

## 2023-06-19 ENCOUNTER — NURSE ONLY (OUTPATIENT)
Dept: HEMATOLOGY/ONCOLOGY | Facility: HOSPITAL | Age: 28
End: 2023-06-19
Attending: STUDENT IN AN ORGANIZED HEALTH CARE EDUCATION/TRAINING PROGRAM
Payer: MEDICAID

## 2023-06-19 ENCOUNTER — HOSPITAL ENCOUNTER (OUTPATIENT)
Dept: CT IMAGING | Facility: HOSPITAL | Age: 28
Discharge: HOME | End: 2023-06-19
Attending: STUDENT IN AN ORGANIZED HEALTH CARE EDUCATION/TRAINING PROGRAM
Payer: MEDICAID

## 2023-06-19 VITALS
HEART RATE: 96 BPM | SYSTOLIC BLOOD PRESSURE: 103 MMHG | RESPIRATION RATE: 16 BRPM | DIASTOLIC BLOOD PRESSURE: 55 MMHG | OXYGEN SATURATION: 100 % | TEMPERATURE: 99 F

## 2023-06-19 DIAGNOSIS — J85.1 ABSCESS OF LOWER LOBE OF RIGHT LUNG WITH PNEUMONIA (HCC): Primary | ICD-10-CM

## 2023-06-19 DIAGNOSIS — J85.1 ABSCESS OF LOWER LOBE OF RIGHT LUNG WITH PNEUMONIA (HCC): ICD-10-CM

## 2023-06-19 LAB
ALBUMIN SERPL-MCNC: 3.1 G/DL (ref 3.4–5)
ALBUMIN/GLOB SERPL: 0.7 {RATIO} (ref 1–2)
ALP LIVER SERPL-CCNC: 70 U/L
ALT SERPL-CCNC: 11 U/L
ANION GAP SERPL CALC-SCNC: 6 MMOL/L (ref 0–18)
AST SERPL-CCNC: 10 U/L (ref 15–37)
BASOPHILS # BLD AUTO: 0.08 X10(3) UL (ref 0–0.2)
BASOPHILS NFR BLD AUTO: 0.9 %
BILIRUB SERPL-MCNC: 0.3 MG/DL (ref 0.1–2)
BUN BLD-MCNC: 9 MG/DL (ref 7–18)
BUN/CREAT SERPL: 16.1 (ref 10–20)
CALCIUM BLD-MCNC: 8.9 MG/DL (ref 8.5–10.1)
CHLORIDE SERPL-SCNC: 110 MMOL/L (ref 98–112)
CO2 SERPL-SCNC: 24 MMOL/L (ref 21–32)
CREAT BLD-MCNC: 0.56 MG/DL
DEPRECATED RDW RBC AUTO: 43.5 FL (ref 35.1–46.3)
EOSINOPHIL # BLD AUTO: 0.34 X10(3) UL (ref 0–0.7)
EOSINOPHIL NFR BLD AUTO: 3.8 %
ERYTHROCYTE [DISTWIDTH] IN BLOOD BY AUTOMATED COUNT: 13.3 % (ref 11–15)
GFR SERPLBLD BASED ON 1.73 SQ M-ARVRAT: 128 ML/MIN/1.73M2 (ref 60–?)
GLOBULIN PLAS-MCNC: 4.6 G/DL (ref 2.8–4.4)
GLUCOSE BLD-MCNC: 96 MG/DL (ref 70–99)
HCT VFR BLD AUTO: 33.5 %
HGB BLD-MCNC: 11 G/DL
IMM GRANULOCYTES # BLD AUTO: 0.02 X10(3) UL (ref 0–1)
IMM GRANULOCYTES NFR BLD: 0.2 %
LYMPHOCYTES # BLD AUTO: 2.41 X10(3) UL (ref 1–4)
LYMPHOCYTES NFR BLD AUTO: 27 %
MCH RBC QN AUTO: 29.1 PG (ref 26–34)
MCHC RBC AUTO-ENTMCNC: 32.8 G/DL (ref 31–37)
MCV RBC AUTO: 88.6 FL
MONOCYTES # BLD AUTO: 0.51 X10(3) UL (ref 0.1–1)
MONOCYTES NFR BLD AUTO: 5.7 %
NEUTROPHILS # BLD AUTO: 5.55 X10 (3) UL (ref 1.5–7.7)
NEUTROPHILS # BLD AUTO: 5.55 X10(3) UL (ref 1.5–7.7)
NEUTROPHILS NFR BLD AUTO: 62.4 %
OSMOLALITY SERPL CALC.SUM OF ELEC: 289 MOSM/KG (ref 275–295)
PLATELET # BLD AUTO: 375 10(3)UL (ref 150–450)
POTASSIUM SERPL-SCNC: 3.7 MMOL/L (ref 3.5–5.1)
PROT SERPL-MCNC: 7.7 G/DL (ref 6.4–8.2)
RBC # BLD AUTO: 3.78 X10(6)UL
SODIUM SERPL-SCNC: 140 MMOL/L (ref 136–145)
WBC # BLD AUTO: 8.9 X10(3) UL (ref 4–11)

## 2023-06-19 PROCEDURE — 80053 COMPREHEN METABOLIC PANEL: CPT

## 2023-06-19 PROCEDURE — 85025 COMPLETE CBC W/AUTO DIFF WBC: CPT

## 2023-06-19 PROCEDURE — 96365 THER/PROPH/DIAG IV INF INIT: CPT

## 2023-06-19 PROCEDURE — 71250 CT THORAX DX C-: CPT | Performed by: STUDENT IN AN ORGANIZED HEALTH CARE EDUCATION/TRAINING PROGRAM

## 2023-06-19 RX ORDER — 0.9 % SODIUM CHLORIDE 0.9 %
INTRAVENOUS SOLUTION INTRAVENOUS
Status: DISCONTINUED
Start: 2023-06-19 | End: 2023-06-19

## 2023-06-19 RX ORDER — ERTAPENEM 1 G/1
INJECTION, POWDER, LYOPHILIZED, FOR SOLUTION INTRAMUSCULAR; INTRAVENOUS
Status: DISCONTINUED
Start: 2023-06-19 | End: 2023-06-19

## 2023-06-19 NOTE — PROGRESS NOTES
Pt to infusion for daily abx. Arrives ambulating independently accompanied by self. Labs drawn from PICC. Invanz infused per order, pt appeared to tolerate well. PICC flushed and capped. Discharged to home ambulating independently.     EOT 6/22

## 2023-06-20 ENCOUNTER — NURSE ONLY (OUTPATIENT)
Dept: HEMATOLOGY/ONCOLOGY | Facility: HOSPITAL | Age: 28
End: 2023-06-20
Attending: HOSPITALIST
Payer: MEDICAID

## 2023-06-20 VITALS
OXYGEN SATURATION: 98 % | DIASTOLIC BLOOD PRESSURE: 76 MMHG | TEMPERATURE: 98 F | RESPIRATION RATE: 16 BRPM | SYSTOLIC BLOOD PRESSURE: 111 MMHG | HEART RATE: 75 BPM

## 2023-06-20 DIAGNOSIS — J85.1 ABSCESS OF LOWER LOBE OF RIGHT LUNG WITH PNEUMONIA (HCC): Primary | ICD-10-CM

## 2023-06-20 PROCEDURE — 96365 THER/PROPH/DIAG IV INF INIT: CPT

## 2023-06-20 RX ORDER — ERTAPENEM 1 G/1
INJECTION, POWDER, LYOPHILIZED, FOR SOLUTION INTRAMUSCULAR; INTRAVENOUS
Status: DISCONTINUED
Start: 2023-06-20 | End: 2023-06-20

## 2023-06-20 RX ORDER — 0.9 % SODIUM CHLORIDE 0.9 %
INTRAVENOUS SOLUTION INTRAVENOUS
Status: DISCONTINUED
Start: 2023-06-20 | End: 2023-06-20

## 2023-06-20 NOTE — PROGRESS NOTES
Pt to infusion for daily abx. Arrives ambulating independently accompanied by self. PICC flushing well with good blood return. Invanz infused per order, pt appeared to tolerate well. PICC flushed and capped. Discharged to home ambulating independently.       EOT 6/22

## 2023-06-21 ENCOUNTER — TELEPHONE (OUTPATIENT)
Dept: HEMATOLOGY/ONCOLOGY | Facility: HOSPITAL | Age: 28
End: 2023-06-21

## 2023-06-22 ENCOUNTER — NURSE ONLY (OUTPATIENT)
Dept: HEMATOLOGY/ONCOLOGY | Facility: HOSPITAL | Age: 28
End: 2023-06-22
Attending: HOSPITALIST
Payer: MEDICAID

## 2023-06-22 VITALS
SYSTOLIC BLOOD PRESSURE: 113 MMHG | TEMPERATURE: 98 F | RESPIRATION RATE: 16 BRPM | HEART RATE: 69 BPM | DIASTOLIC BLOOD PRESSURE: 74 MMHG | OXYGEN SATURATION: 100 %

## 2023-06-22 DIAGNOSIS — J85.1 ABSCESS OF LOWER LOBE OF RIGHT LUNG WITH PNEUMONIA (HCC): Primary | ICD-10-CM

## 2023-06-22 PROCEDURE — 96365 THER/PROPH/DIAG IV INF INIT: CPT

## 2023-06-22 RX ORDER — 0.9 % SODIUM CHLORIDE 0.9 %
INTRAVENOUS SOLUTION INTRAVENOUS
Status: DISCONTINUED
Start: 2023-06-22 | End: 2023-06-22

## 2023-06-22 RX ORDER — ERTAPENEM 1 G/1
INJECTION, POWDER, LYOPHILIZED, FOR SOLUTION INTRAMUSCULAR; INTRAVENOUS
Status: DISCONTINUED
Start: 2023-06-22 | End: 2023-06-22

## 2023-06-22 NOTE — PROGRESS NOTES
Patient arrives ambulating independently for daily antibiotics of INVANZ. LEFT PICC in place, positive blood return. Denies any complaints, tolerated infusion well, no s/s of adverse reaction. LEFT PICC line removed per order. Today is last treatment day for antibiotics. Site covered with gauze and tegaderm. Instructed patient to remove in 24 hours and no heavy lifting.  Any concerns with the incision site such as swelling, redness, any irritation, etc - instructed patient to notify ID.     EOT: 6/22

## 2023-06-28 ENCOUNTER — OFFICE VISIT (OUTPATIENT)
Dept: PULMONOLOGY | Facility: CLINIC | Age: 28
End: 2023-06-28

## 2023-06-28 VITALS
TEMPERATURE: 99 F | HEIGHT: 61 IN | WEIGHT: 174 LBS | SYSTOLIC BLOOD PRESSURE: 103 MMHG | BODY MASS INDEX: 32.85 KG/M2 | DIASTOLIC BLOOD PRESSURE: 72 MMHG | RESPIRATION RATE: 18 BRPM | OXYGEN SATURATION: 98 % | HEART RATE: 83 BPM

## 2023-06-28 DIAGNOSIS — J85.1 ABSCESS OF LOWER LOBE OF RIGHT LUNG WITH PNEUMONIA (HCC): ICD-10-CM

## 2023-06-28 DIAGNOSIS — J18.9 PNEUMONIA OF RIGHT LOWER LOBE DUE TO INFECTIOUS ORGANISM: Primary | ICD-10-CM

## 2023-06-28 PROCEDURE — 99214 OFFICE O/P EST MOD 30 MIN: CPT | Performed by: INTERNAL MEDICINE

## 2023-06-28 PROCEDURE — 3078F DIAST BP <80 MM HG: CPT | Performed by: INTERNAL MEDICINE

## 2023-06-28 PROCEDURE — 3074F SYST BP LT 130 MM HG: CPT | Performed by: INTERNAL MEDICINE

## 2023-06-28 PROCEDURE — 3008F BODY MASS INDEX DOCD: CPT | Performed by: INTERNAL MEDICINE

## 2023-08-21 ENCOUNTER — HOSPITAL ENCOUNTER (EMERGENCY)
Facility: HOSPITAL | Age: 28
Discharge: HOME | End: 2023-08-21
Attending: STUDENT IN AN ORGANIZED HEALTH CARE EDUCATION/TRAINING PROGRAM
Payer: MEDICAID

## 2023-08-21 ENCOUNTER — APPOINTMENT (OUTPATIENT)
Dept: GENERAL RADIOLOGY | Facility: HOSPITAL | Age: 28
End: 2023-08-21
Attending: STUDENT IN AN ORGANIZED HEALTH CARE EDUCATION/TRAINING PROGRAM
Payer: MEDICAID

## 2023-08-21 VITALS
HEIGHT: 61 IN | TEMPERATURE: 97 F | DIASTOLIC BLOOD PRESSURE: 81 MMHG | SYSTOLIC BLOOD PRESSURE: 128 MMHG | WEIGHT: 178 LBS | OXYGEN SATURATION: 98 % | RESPIRATION RATE: 15 BRPM | BODY MASS INDEX: 33.61 KG/M2 | HEART RATE: 86 BPM

## 2023-08-21 DIAGNOSIS — S93.401A MODERATE RIGHT ANKLE SPRAIN, INITIAL ENCOUNTER: Primary | ICD-10-CM

## 2023-08-21 PROCEDURE — 99284 EMERGENCY DEPT VISIT MOD MDM: CPT

## 2023-08-21 PROCEDURE — 99283 EMERGENCY DEPT VISIT LOW MDM: CPT

## 2023-08-21 PROCEDURE — 73610 X-RAY EXAM OF ANKLE: CPT | Performed by: STUDENT IN AN ORGANIZED HEALTH CARE EDUCATION/TRAINING PROGRAM

## 2023-08-21 RX ORDER — ACETAMINOPHEN 500 MG
1000 TABLET ORAL ONCE
Status: COMPLETED | OUTPATIENT
Start: 2023-08-21 | End: 2023-08-21
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
## 2024-01-22 ENCOUNTER — HOSPITAL ENCOUNTER (EMERGENCY)
Facility: HOSPITAL | Age: 29
Discharge: HOME | End: 2024-01-22
Attending: EMERGENCY MEDICINE
Payer: MEDICAID

## 2024-01-22 VITALS
BODY MASS INDEX: 37 KG/M2 | WEIGHT: 197 LBS | SYSTOLIC BLOOD PRESSURE: 122 MMHG | RESPIRATION RATE: 18 BRPM | TEMPERATURE: 98 F | HEART RATE: 69 BPM | DIASTOLIC BLOOD PRESSURE: 99 MMHG | OXYGEN SATURATION: 97 %

## 2024-01-22 DIAGNOSIS — J01.10 ACUTE FRONTAL SINUSITIS, RECURRENCE NOT SPECIFIED: Primary | ICD-10-CM

## 2024-01-22 LAB
FLUAV + FLUBV RNA SPEC NAA+PROBE: NEGATIVE
FLUAV + FLUBV RNA SPEC NAA+PROBE: NEGATIVE
RSV RNA SPEC NAA+PROBE: NEGATIVE
SARS-COV-2 RNA RESP QL NAA+PROBE: NOT DETECTED

## 2024-01-22 PROCEDURE — 99283 EMERGENCY DEPT VISIT LOW MDM: CPT

## 2024-01-22 PROCEDURE — 0241U SARS-COV-2/FLU A AND B/RSV BY PCR (GENEXPERT): CPT | Performed by: EMERGENCY MEDICINE

## 2024-01-22 RX ORDER — AMOXICILLIN AND CLAVULANATE POTASSIUM 875; 125 MG/1; MG/1
1 TABLET, FILM COATED ORAL 2 TIMES DAILY
Qty: 14 TABLET | Refills: 0 | Status: SHIPPED | OUTPATIENT
Start: 2024-01-22 | End: 2024-01-29

## 2024-01-22 NOTE — ED PROVIDER NOTES
Patient Seen in: [facility] Emergency Department    History     Chief Complaint   Patient presents with    Sinus Problem       HPI    28-year-old female presents ER with complaint of congestion and head pain.  Patient states that she feels like her sinuses are congested.  Patient denies any fevers or chills.    History reviewed.   Past Medical History:   Diagnosis Date    Asthma        History reviewed. History reviewed. No pertinent surgical history.      Medications :  (Not in a hospital admission)       No family history on file.    Smoking Status:   Social History     Socioeconomic History    Marital status: Single   Tobacco Use    Smoking status: Former     Types: Cigarettes     Quit date: 5/10/2023     Years since quittin.7    Smokeless tobacco: Never   Vaping Use    Vaping Use: Never used   Substance and Sexual Activity    Alcohol use: Not Currently    Drug use: Not Currently       ROS  All pertinent positives for the review of systems are mentioned in the HPI  All other organ systems are reviewed and are negative.    Constitutional and vital signs reviewed.      Social History and Family History elements reviewed from today, pertinent positives to the presenting problem noted.    Physical Exam     ED Triage Vitals   BP 24 0800 124/70   Pulse 24 0800 95   Resp 24 0800 18   Temp 24 0800 98 °F (36.7 °C)   Temp src --    SpO2 24 0800 100 %   O2 Device 24 0930 None (Room air)       All measures to prevent infection transmission during my interaction with the patient were taken. The patient was already wearing a droplet mask on my arrival to the room. Personal protective equipment including droplet mask, eye protection, and gloves were worn throughout the duration of the exam.  Handwashing was performed prior to and after the exam.  Stethoscope and any equipment used during my examination was cleaned with super sani-cloth germicidal wipes following the exam.      Physical Exam  Vitals and nursing note reviewed.   Constitutional:       Appearance: She is well-developed.   HENT:      Head: Normocephalic and atraumatic.      Comments: Positive frontal sinus tenderness     Right Ear: External ear normal.      Left Ear: External ear normal.      Nose: Congestion present.   Eyes:      Conjunctiva/sclera: Conjunctivae normal.      Pupils: Pupils are equal, round, and reactive to light.   Cardiovascular:      Rate and Rhythm: Normal rate and regular rhythm.      Heart sounds: Normal heart sounds.   Pulmonary:      Effort: Pulmonary effort is normal.      Breath sounds: Normal breath sounds.   Abdominal:      General: Bowel sounds are normal.      Palpations: Abdomen is soft.   Musculoskeletal:         General: Normal range of motion.      Cervical back: Normal range of motion and neck supple.   Skin:     General: Skin is warm and dry.   Neurological:      Mental Status: She is alert and oriented to person, place, and time.      Deep Tendon Reflexes: Reflexes are normal and symmetric.   Psychiatric:         Behavior: Behavior normal.         Thought Content: Thought content normal.         Judgment: Judgment normal.         ED Course        Labs Reviewed   SARS-COV-2/FLU A AND B/RSV BY PCR ([facility]) - Normal    Narrative:     This test is intended for the qualitative detection and differentiation of SARS-CoV-2, influenza A, influenza B, and respiratory syncytial virus (RSV) viral RNA in nasopharyngeal or nares swabs from individuals suspected of respiratory viral infection consistent with COVID-19 by their healthcare provider. Signs and symptoms of respiratory viral infection due to SARS-CoV-2, influenza, and RSV can be similar.                                    Test performed using the Xpert Xpress SARS-CoV-2/FLU/RSV (real time RT-PCR)  assay on the GeneXpert instrument, [employer], [place] [address].                   This test is being used under the Food and Drug  Administration's Emergency Use Authorization.                                    The authorized Fact Sheet for Healthcare Providers for this assay is available upon request from the laboratory.         Imaging Results Available and Reviewed while in ED: No results found.  ED Medications Administered: Medications - No data to display      MDM     Vitals:    01/22/24 0800 01/22/24 0930   BP: 124/70 (!) 122/99   Pulse: 95 69   Resp: 18    Temp: 98 °F (36.7 °C)    SpO2: 100% 97%   Weight: 89.4 kg      *I personally reviewed and interpreted all ED vitals.  I also personally reviewed all labs and imaging if ordered    Pulse Ox: 100%, Room air, Normal     Monitor Interpretation:   normal sinus rhythm    Differential Diagnosis/ Diagnostic Considerations: COVID-19, sinusitis, sinus congestion.    Medical Record Review: I personally reviewed available prior medical records for any recent pertinent discharge summaries, testing, and procedures and reviewed those reports.    Complicating Factors: The patient already has does not have any pertinent problems on file. to contribute to the complexity of this ED evaluation.    Medical Decision Making  28-year-old female presents ER with complaint of sinus congestion.  Patient is an expert negative.  Patient also with headache.  Patient likely with sinusitis.  Patient with frontal maxillary sinus tenderness.  Patient discharged home with Augmentin for neck 7 days instructed follow-up PCP if symptoms continue.  Patient's mother bedside made aware of the discharge plan disposition.    Problems Addressed:  Acute frontal sinusitis, recurrence not specified: acute illness or injury    Amount and/or Complexity of Data Reviewed  Independent Historian:      Details: Patient's mother bedside states she been having the pain now for the past 5 days.  Labs: ordered. Decision-making details documented in ED Course.    Risk  Prescription drug management.        Condition upon leaving the  department: Stable    Disposition and Plan     Clinical Impression:  1. Acute frontal sinusitis, recurrence not specified        Disposition:  Discharge    Follow-up:  Sam Barnes MD  [address] [facility]  [address] [place] [address]  [phone]    Schedule an appointment as soon as possible for a visit  If symptoms worsen      Medications Prescribed:  Current Discharge Medication List        START taking these medications    Details   amoxicillin clavulanate 875-125 MG Oral Tab Take 1 tablet by mouth 2 (two) times daily for 7 days.  Qty: 14 tablet, Refills: 0

## 2024-05-15 ENCOUNTER — HOSPITAL ENCOUNTER (EMERGENCY)
Facility: HOSPITAL | Age: 29
Discharge: HOME | End: 2024-05-15
Attending: EMERGENCY MEDICINE

## 2024-05-15 VITALS
OXYGEN SATURATION: 98 % | SYSTOLIC BLOOD PRESSURE: 120 MMHG | BODY MASS INDEX: 37 KG/M2 | HEART RATE: 86 BPM | DIASTOLIC BLOOD PRESSURE: 80 MMHG | WEIGHT: 198.44 LBS | TEMPERATURE: 99 F | RESPIRATION RATE: 24 BRPM

## 2024-05-15 DIAGNOSIS — N89.8 VAGINAL DISCHARGE: ICD-10-CM

## 2024-05-15 DIAGNOSIS — R35.0 URINARY FREQUENCY: Primary | ICD-10-CM

## 2024-05-15 LAB
B-HCG UR QL: NEGATIVE
BILIRUB UR QL: NEGATIVE
CLARITY UR: CLEAR
COLOR UR: COLORLESS
GLUCOSE BLDC GLUCOMTR-MCNC: 126 MG/DL (ref 70–99)
GLUCOSE UR-MCNC: NORMAL MG/DL
HGB UR QL STRIP.AUTO: NEGATIVE
KETONES UR-MCNC: NEGATIVE MG/DL
LEUKOCYTE ESTERASE UR QL STRIP.AUTO: NEGATIVE
NITRITE UR QL STRIP.AUTO: NEGATIVE
PH UR: 6 [PH] (ref 5–8)
PROT UR-MCNC: NEGATIVE MG/DL
SP GR UR STRIP: 1.01 (ref 1–1.03)
UROBILINOGEN UR STRIP-ACNC: NORMAL

## 2024-05-15 PROCEDURE — 87086 URINE CULTURE/COLONY COUNT: CPT | Performed by: EMERGENCY MEDICINE

## 2024-05-15 PROCEDURE — 87591 N.GONORRHOEAE DNA AMP PROB: CPT | Performed by: EMERGENCY MEDICINE

## 2024-05-15 PROCEDURE — 99284 EMERGENCY DEPT VISIT MOD MDM: CPT

## 2024-05-15 PROCEDURE — 87491 CHLMYD TRACH DNA AMP PROBE: CPT | Performed by: EMERGENCY MEDICINE

## 2024-05-15 PROCEDURE — 81003 URINALYSIS AUTO W/O SCOPE: CPT | Performed by: EMERGENCY MEDICINE

## 2024-05-15 PROCEDURE — 81514 NFCT DS BV&VAGINITIS DNA ALG: CPT | Performed by: EMERGENCY MEDICINE

## 2024-05-15 PROCEDURE — 81025 URINE PREGNANCY TEST: CPT

## 2024-05-15 PROCEDURE — 82962 GLUCOSE BLOOD TEST: CPT

## 2024-05-15 RX ORDER — FLUCONAZOLE 150 MG/1
150 TABLET ORAL ONCE
Status: COMPLETED | OUTPATIENT
Start: 2024-05-15 | End: 2024-05-15

## 2024-05-15 NOTE — ED INITIAL ASSESSMENT (HPI)
Patient ambulatory to ED with complaint of dysuria that started last night.       Patient is AXOX4.

## 2024-05-15 NOTE — ED PROVIDER NOTES
Patient Seen in: [facility] Emergency Department    History     Chief Complaint   Patient presents with    Eval-G     Stated Complaint: Abd pain     HPI    29-year-old female without past medical history presenting with mother for evaluation with urinary frequency and questionable dysuria somewhat similar to prior candidiasis and vaguely/less similar to prior UTI since last night.  No discharge, no new partners; no history or concern for STI though with prior history of genital herpes and noting symptoms to be dissimilar.  No fevers or chills, nausea or vomiting.  No new hygienic products, underwear or douching.  No cutaneous change.    Past Medical History:    Asthma (HCC)       History reviewed. No pertinent surgical history.         History reviewed. No pertinent family history.    Social History     Socioeconomic History    Marital status: Single   Tobacco Use    Smoking status: Former     Current packs/day: 0.00     Types: Cigarettes     Quit date: 5/10/2023     Years since quittin.0    Smokeless tobacco: Never   Vaping Use    Vaping status: Never Used   Substance and Sexual Activity    Alcohol use: Not Currently    Drug use: Not Currently     Social Determinants of Health      [facility]    Social Connections    Received from [facility], [facility]    Housing Stability       Review of Systems :  Constitutional: As per HPI  Genitourinary: Negative for dysuria and hematuria. (+) urinary frequency.    Positive for stated complaint: Abd pain  Other systems are as noted in HPI.  Constitutional and vital signs reviewed.      All other systems reviewed and negative except as noted above.    [facility] and agreed except as otherwise stated in HPI.    Physical Exam     ED Triage Vitals [05/15/24 0944]   BP (!) 125/92   Pulse 60   Resp 18   Temp 98.6 °F (37 °C)   Temp src Temporal    SpO2 100 %   O2 Device None (Room air)       Current:BP (!) 125/92   Pulse 60   Temp 98.6 °F (37 °C) (Temporal)   Resp 18   Wt 90 kg   LMP 05/10/2023 (Approximate)   SpO2 100%   BMI 37.49 kg/m²         Physical Exam   Constitutional: No distress.   HEENT: MMM.  Head: Normocephalic.   Eyes: No injection.   Cardiovascular: RRR.   Pulmonary/Chest: Effort normal. CTAB.  Abdominal: Soft. Nontender.  : Chaperoned by MERVIN Yeager. Os closed with white mucoid discharge; no CMT/adnexal tenderness.  Musculoskeletal: No gross deformity.    Neurological: Alert.   Skin: Skin is warm.   Psychiatric: Cooperative.  Nursing note and vitals reviewed.        ED Course     Labs Reviewed   URINALYSIS, ROUTINE - Abnormal; Notable for the following components:       Result Value    Urine Color Colorless (*)     All other components within normal limits   POCT PREGNANCY URINE - Normal   URINE CULTURE, ROUTINE   CHLAMYDIA/GONOCOCCUS, SALAS   VAGINITIS VAGINOSIS PCR PANEL            MDM   DIFFERENTIAL DIAGNOSIS: After history and physical exam differential diagnosis includes but is not limited to UTI, gravid state STI, candidiasis, vaginosis.    Pulse ox: 100%:Normal on RA, as independently interpreted by myself    Medical Decision Making  Evaluation for urinary frequency without associated dysuria or vaginal discharge and without history/concern for STI.  Urinalysis nonacute with culture sent, chaperoned pelvic as noted with swabs sent - stable for discharge with ongoing outpatient followup; presentation/exam similar to prior candidiasis for which empiric fluconazole initiated.    Problems Addressed:  Urinary frequency: acute illness or injury  Vaginal discharge: acute illness or injury    Amount and/or Complexity of Data Reviewed  Labs: ordered. Decision-making details documented in ED Course.    Risk  Prescription drug management.      I was wearing at minimum a facemask and eye protection throughout this encounter with handwashing  performed prior and after patient evaluation without personal hand/facial/oropharyngeal contact and gloves worn throughout encounter. See note and/or contact this provider for further PPE details.    Disposition and Plan     Clinical Impression:  1. Urinary frequency        Disposition:  Discharge    Follow-up:  Ariane St MD  [address] [place] [id number]  [phone]    Call  For primary care followup and re-evaluation.    Marjorie Franks MD  [address] [place] [address]  [address]  [place] [address]  [phone]    Call  As needed for gynecology followup and re-evaluation.      Medications Prescribed:  Discharge Medication List as of 5/15/2024 12:01 PM

## 2024-05-16 LAB
BV BACTERIA DNA VAG QL NAA+PROBE: POSITIVE
C GLABRATA DNA VAG QL NAA+PROBE: NEGATIVE
C KRUSEI DNA VAG QL NAA+PROBE: NEGATIVE
C TRACH DNA SPEC QL NAA+PROBE: NEGATIVE
CANDIDA DNA VAG QL NAA+PROBE: NEGATIVE
N GONORRHOEA DNA SPEC QL NAA+PROBE: NEGATIVE
T VAGINALIS DNA VAG QL NAA+PROBE: NEGATIVE

## 2024-05-16 RX ORDER — METRONIDAZOLE 500 MG/1
500 TABLET ORAL 2 TIMES DAILY
Qty: 14 TABLET | Refills: 0 | Status: SHIPPED | OUTPATIENT
Start: 2024-05-16 | End: 2024-05-23

## 2024-05-16 NOTE — PROGRESS NOTES
ED Culture Callback Results Review    Pharmacist reviewed culture results from ED visit .    Final vaginal panel positive for untreated BV.    A new prescription for metronidazole was electronically sent to [facility] as discussed with Dr. Mota. The patient was contacted by phone, informed of the results, and educated regarding the new therapy. The patient verbalized understanding of the treatment plan and all questions were answered.    Leoanrda Fowler, [place]   Emergency Medicine Pharmacist Specialist  05/16/24; 2:51 PM

## 2024-09-09 ENCOUNTER — APPOINTMENT (OUTPATIENT)
Dept: GENERAL RADIOLOGY | Facility: HOSPITAL | Age: 29
End: 2024-09-09
Attending: STUDENT IN AN ORGANIZED HEALTH CARE EDUCATION/TRAINING PROGRAM
Payer: COMMERCIAL

## 2024-09-09 ENCOUNTER — HOSPITAL ENCOUNTER (EMERGENCY)
Facility: HOSPITAL | Age: 29
Discharge: HOME | End: 2024-09-09
Attending: STUDENT IN AN ORGANIZED HEALTH CARE EDUCATION/TRAINING PROGRAM
Payer: COMMERCIAL

## 2024-09-09 VITALS
BODY MASS INDEX: 40.4 KG/M2 | HEART RATE: 84 BPM | WEIGHT: 214 LBS | SYSTOLIC BLOOD PRESSURE: 125 MMHG | DIASTOLIC BLOOD PRESSURE: 83 MMHG | HEIGHT: 61 IN | OXYGEN SATURATION: 98 % | TEMPERATURE: 99 F | RESPIRATION RATE: 16 BRPM

## 2024-09-09 DIAGNOSIS — S40.022A ARM CONTUSION, LEFT, INITIAL ENCOUNTER: Primary | ICD-10-CM

## 2024-09-09 PROCEDURE — 99283 EMERGENCY DEPT VISIT LOW MDM: CPT

## 2024-09-09 PROCEDURE — 73000 X-RAY EXAM OF COLLAR BONE: CPT | Performed by: STUDENT IN AN ORGANIZED HEALTH CARE EDUCATION/TRAINING PROGRAM

## 2024-09-09 PROCEDURE — 99284 EMERGENCY DEPT VISIT MOD MDM: CPT

## 2024-09-09 PROCEDURE — 73030 X-RAY EXAM OF SHOULDER: CPT | Performed by: STUDENT IN AN ORGANIZED HEALTH CARE EDUCATION/TRAINING PROGRAM

## 2024-09-09 RX ORDER — ACETAMINOPHEN 500 MG
1000 TABLET ORAL ONCE
Status: COMPLETED | OUTPATIENT
Start: 2024-09-09 | End: 2024-09-09

## 2024-09-09 RX ORDER — CYCLOBENZAPRINE HCL 10 MG
10 TABLET ORAL 3 TIMES DAILY PRN
Qty: 20 TABLET | Refills: 0 | Status: SHIPPED | OUTPATIENT
Start: 2024-09-09 | End: 2024-09-16

## 2024-09-09 RX ORDER — LIDOCAINE 50 MG/G
1 PATCH TOPICAL EVERY 24 HOURS
Qty: 7 PATCH | Refills: 0 | Status: SHIPPED | OUTPATIENT
Start: 2024-09-09 | End: 2024-09-16

## 2024-09-09 NOTE — ED PROVIDER NOTES
[facility] Emergency Department Note  Patient: Supa Jenkins Age: 29 year old Sex: female      MRN: [id number]  : 1995    Patient Seen in: [facility] Emergency Department    History     Chief Complaint   Patient presents with    Arm Pain     Stated Complaint: Arm Pain    History obtained from: Patient    29-year-old female with past medical history of asthma presenting today for evaluation of left shoulder pain.  She states that 3 days ago, she fell onto her left upper arm while climbing up the stairs.  She states that over the past 3 days, has been having gradually worsening pain in the left shoulder.  States that she has been stretching and ranging the arm to try and keep it loose but states that the pain worsened this morning prompting evaluation in the emergency department.  She denies any numbness or tingling.  Denies any head trauma or LOC.  Denies any other injury.    Review of Systems:  Review of Systems  Positive for stated complaint: Arm Pain. Constitutional and vital signs reviewed. All other systems reviewed and negative except as noted above.    Patient History:  Past Medical History:    Asthma (HCC)       History reviewed. No pertinent surgical history.     No family history on file.    Specific Social Determinants of Health:   Social History     Socioeconomic History    Marital status: Single   Tobacco Use    Smoking status: Former     Current packs/day: 0.00     Types: Cigarettes     Quit date: 5/10/2023     Years since quittin.3    Smokeless tobacco: Never   Vaping Use    Vaping status: Never Used   Substance and Sexual Activity    Alcohol use: Not Currently    Drug use: Not Currently     Social Determinants of Health      [facility] from [facility], [facility]    Social Connections    Received from [facility], [facility]    Housing Stability           [facility] elements reviewed from today and agreed except as  otherwise stated in HPI.    Physical Exam     ED Triage Vitals [09/09/24 0649]   /79   Pulse 78   Resp 18   Temp 98.5 °F (36.9 °C)   Temp src Oral   SpO2 98 %   O2 Device None (Room air)       Current:/83   Pulse 84   Temp 98.5 °F (36.9 °C) (Oral)   Resp 16   Ht 154.9 cm (5' 1\")   Wt 97.1 kg   LMP 08/31/2024 (Approximate)   SpO2 98%   BMI 40.43 kg/m²         Physical Exam  Constitutional:       General: She is not in acute distress.  HENT:      Head: Normocephalic and atraumatic.      Mouth/Throat:      Mouth: Mucous membranes are moist.   Eyes:      Extraocular Movements: Extraocular movements intact.   Cardiovascular:      Rate and Rhythm: Normal rate and regular rhythm.      Heart sounds: Normal heart sounds.   Pulmonary:      Effort: Pulmonary effort is normal. No respiratory distress.      Breath sounds: Normal breath sounds.   Abdominal:      Palpations: Abdomen is soft.      Tenderness: There is no abdominal tenderness.   Musculoskeletal:      Comments: Active range of motion of the left shoulder and elbow with full flexion extension of the left elbow.  Bony tenderness over the distal left clavicle and shoulder joint anterior and posteriorly, no obvious deformity.  Reproducible pain with shoulder extension and abduction greater than 90 degrees.  Pulses 2+ equal bilaterally.  Brisk capillary refill.  No bony tenderness over the forearm, hand or wrist   Skin:     General: Skin is warm and dry.      Capillary Refill: Capillary refill takes less than 2 seconds.      Findings: No rash.      Comments: Scattered 2 cm areas of ecchymoses over the distal left upper arm   Neurological:      General: No focal deficit present.      Mental Status: She is alert and oriented to person, place, and time.      Sensory: No sensory deficit.   Psychiatric:         Mood and Affect: Mood normal.         Behavior: Behavior normal.         ED Course   Labs:   Labs Reviewed - No data to display  Radiology findings:   I personally reviewed the images.   XR CLAVICLE, COMPLETE, LEFT (CPT=73000)    Result Date: 9/9/2024  CONCLUSION: Normal examination.     Dictated by (CST): Scott Back MD on 9/09/2024 at 9:29 AM     Finalized by (CST): Scott Back MD on 9/09/2024 at 9:30 AM          XR SHOULDER, COMPLETE (MIN 2 VIEWS), LEFT (CPT=73030)    Result Date: 9/9/2024  CONCLUSION: Normal examination.     Dictated by (CST): Scott Back MD on 9/09/2024 at 9:28 AM     Finalized by (CST): Scott Back MD on 9/09/2024 at 9:29 AM             Cardiac Monitor: Interpreted by me.   Pulse Readings from Last 1 Encounters:   09/09/24 84   , sinus,         MDM   79-year-old female with a past medical history of asthma presenting today for evaluation of left shoulder pain after mechanical fall 3 days ago.  Exam as above no evidence of neurovascular injury    Differential diagnoses considered includes, but is not limited to: Contusion versus proximal humerus fracture versus shoulder dislocation versus clavicular fracture contusion    Will obtain the following tests: X-ray left clavicle, x-ray left shoulder  Please see ED course for my independent review of these tests/imaging results.    Initial Medications/Therapeutics administered: Tylenol    Chronic conditions affecting care: Asthma    Workup and medications considered but not ordered: None    Social Determinants of Health that impacted care: None    ED Course as of 09/09/24 0959  ------------------------------------------------------------  Time: 09/09 0957  Comment: Pain mildly improved following Tylenol.  I independently reviewed the left shoulder x-ray that shows no obvious dislocation.  Agree with radiology reads above.  Suspect her symptoms are secondary to contusion versus possible rotator cuff injury.  Discussed need for outpatient PCP follow-up for outpatient MRI should symptoms pain persist greater than 1 to 2 weeks.  Return precautions provided and all questions answered.   Patient expressed understanding and agreement with plan.            Disposition and Plan     Clinical Impression:  1. Arm contusion, left, initial encounter        Disposition:  Discharge    Follow-up:  Florencio Brandon MD  [address] [address] [phone]    Schedule an appointment as soon as possible for a visit in 2 day(s)  As needed, If symptoms worsen      Medications Prescribed:  Discharge Medication List as of 9/9/2024  9:48 AM        START taking these medications    Details   cyclobenzaprine 10 MG Oral Tab Take 1 tablet (10 mg total) by mouth 3 (three) times daily as needed for Muscle spasms., Normal, Disp-20 tablet, R-0      lidocaine 5 % External Patch Place 1 patch onto the skin daily for 7 days., Normal, Disp-7 patch, R-0               This note may have been created using voice dictation technology and may include inadvertent errors.      Rula Goodrich MD  Emergency Medicine

## 2024-09-09 NOTE — DISCHARGE INSTRUCTIONS
Thank you for seeking care at [facility] Emergency Department.  As discussed, you should take Ibuprofen (also called Advil or Motrin) or Naproxen (also called Aleve) for your pain. If you are taking Ibuprofen, you can take 600 mg every 6 hours, or 800 mg every 8 hours. If you are taking Naproxen, you can take 500 mg every 12 hours. Do not take more than this amount as it can cause kidney problems or bleeding in your stomach. Do not take these medications at the same time as it can increase your risk for these side effects.     If your pain is not controlled you can also take Acetaminophen (also called Tylenol) 1 gram every 6 hours. Do not take more than 4 grams over the course of a day from all medications you take. You can take this medication in alternation with Ibuprofen so that every 3 hours you are taking either 600 mg of Ibuprofen or 1 gram of Acetaminophen.     As previously advised, please follow up with your primary care doctor for further management of your pain.

## 2024-09-09 NOTE — ED INITIAL ASSESSMENT (HPI)
Pt arrives to triage with c/o left arm pain after falling up the stairs Friday evening. PT endorses waking up today with increased pain and decreased ROM. No visual deformities. CMS intact

## 2024-09-13 ENCOUNTER — LAB ENCOUNTER (OUTPATIENT)
Dept: LAB | Age: 29
End: 2024-09-13
Payer: COMMERCIAL

## 2024-09-13 ENCOUNTER — OFFICE VISIT (OUTPATIENT)
Dept: FAMILY MEDICINE CLINIC | Facility: CLINIC | Age: 29
End: 2024-09-13

## 2024-09-13 VITALS
HEIGHT: 61 IN | BODY MASS INDEX: 40.97 KG/M2 | SYSTOLIC BLOOD PRESSURE: 114 MMHG | WEIGHT: 217 LBS | TEMPERATURE: 98 F | RESPIRATION RATE: 16 BRPM | DIASTOLIC BLOOD PRESSURE: 68 MMHG | OXYGEN SATURATION: 97 % | HEART RATE: 104 BPM

## 2024-09-13 DIAGNOSIS — M25.512 ACUTE PAIN OF LEFT SHOULDER: ICD-10-CM

## 2024-09-13 DIAGNOSIS — Z84.89 FAMILY HISTORY OF UTERINE FIBROID: ICD-10-CM

## 2024-09-13 DIAGNOSIS — N92.6 MENSTRUAL ABNORMALITY: ICD-10-CM

## 2024-09-13 DIAGNOSIS — Z00.00 ROUTINE PHYSICAL EXAMINATION: Primary | ICD-10-CM

## 2024-09-13 DIAGNOSIS — Z00.00 ROUTINE PHYSICAL EXAMINATION: ICD-10-CM

## 2024-09-13 DIAGNOSIS — J45.20 MILD INTERMITTENT ASTHMA WITHOUT COMPLICATION (HCC): ICD-10-CM

## 2024-09-13 LAB
ALBUMIN SERPL-MCNC: 4.3 G/DL (ref 3.2–4.8)
ALBUMIN/GLOB SERPL: 1.2 {RATIO} (ref 1–2)
ALP LIVER SERPL-CCNC: 65 U/L
ALT SERPL-CCNC: 7 U/L
ANION GAP SERPL CALC-SCNC: 8 MMOL/L (ref 0–18)
AST SERPL-CCNC: 16 U/L (ref ?–34)
BASOPHILS # BLD AUTO: 0.07 X10(3) UL (ref 0–0.2)
BASOPHILS NFR BLD AUTO: 0.6 %
BILIRUB SERPL-MCNC: 0.4 MG/DL (ref 0.3–1.2)
BUN BLD-MCNC: 10 MG/DL (ref 9–23)
BUN/CREAT SERPL: 13 (ref 10–20)
CALCIUM BLD-MCNC: 9.2 MG/DL (ref 8.7–10.4)
CHLORIDE SERPL-SCNC: 107 MMOL/L (ref 98–112)
CHOLEST SERPL-MCNC: 197 MG/DL (ref ?–200)
CO2 SERPL-SCNC: 23 MMOL/L (ref 21–32)
CREAT BLD-MCNC: 0.77 MG/DL
DEPRECATED HBV CORE AB SER IA-ACNC: 54.6 NG/ML
DEPRECATED RDW RBC AUTO: 44.3 FL (ref 35.1–46.3)
EGFRCR SERPLBLD CKD-EPI 2021: 108 ML/MIN/1.73M2 (ref 60–?)
EOSINOPHIL # BLD AUTO: 0.11 X10(3) UL (ref 0–0.7)
EOSINOPHIL NFR BLD AUTO: 1 %
ERYTHROCYTE [DISTWIDTH] IN BLOOD BY AUTOMATED COUNT: 13.4 % (ref 11–15)
FASTING PATIENT LIPID ANSWER: NO
FASTING STATUS PATIENT QL REPORTED: NO
GLOBULIN PLAS-MCNC: 3.5 G/DL (ref 2–3.5)
GLUCOSE BLD-MCNC: 87 MG/DL (ref 70–99)
HCT VFR BLD AUTO: 42 %
HDLC SERPL-MCNC: 66 MG/DL (ref 40–59)
HGB BLD-MCNC: 14.5 G/DL
IMM GRANULOCYTES # BLD AUTO: 0.03 X10(3) UL (ref 0–1)
IMM GRANULOCYTES NFR BLD: 0.3 %
IRON SATN MFR SERPL: 18 %
IRON SERPL-MCNC: 57 UG/DL
LDLC SERPL CALC-MCNC: 121 MG/DL (ref ?–100)
LYMPHOCYTES # BLD AUTO: 2.64 X10(3) UL (ref 1–4)
LYMPHOCYTES NFR BLD AUTO: 24.4 %
MCH RBC QN AUTO: 31 PG (ref 26–34)
MCHC RBC AUTO-ENTMCNC: 34.5 G/DL (ref 31–37)
MCV RBC AUTO: 89.9 FL
MONOCYTES # BLD AUTO: 0.5 X10(3) UL (ref 0.1–1)
MONOCYTES NFR BLD AUTO: 4.6 %
NEUTROPHILS # BLD AUTO: 7.46 X10 (3) UL (ref 1.5–7.7)
NEUTROPHILS # BLD AUTO: 7.46 X10(3) UL (ref 1.5–7.7)
NEUTROPHILS NFR BLD AUTO: 69.1 %
NONHDLC SERPL-MCNC: 131 MG/DL (ref ?–130)
OSMOLALITY SERPL CALC.SUM OF ELEC: 284 MOSM/KG (ref 275–295)
PLATELET # BLD AUTO: 269 10(3)UL (ref 150–450)
POTASSIUM SERPL-SCNC: 4.2 MMOL/L (ref 3.5–5.1)
PROT SERPL-MCNC: 7.8 G/DL (ref 5.7–8.2)
RBC # BLD AUTO: 4.67 X10(6)UL
SODIUM SERPL-SCNC: 138 MMOL/L (ref 136–145)
TIBC SERPL-MCNC: 322 UG/DL (ref 250–425)
TRANSFERRIN SERPL-MCNC: 216 MG/DL (ref 250–380)
TRIGL SERPL-MCNC: 52 MG/DL (ref 30–149)
TSI SER-ACNC: 2.14 MIU/ML (ref 0.55–4.78)
VLDLC SERPL CALC-MCNC: 9 MG/DL (ref 0–30)
WBC # BLD AUTO: 10.8 X10(3) UL (ref 4–11)

## 2024-09-13 PROCEDURE — 83540 ASSAY OF IRON: CPT

## 2024-09-13 PROCEDURE — 82728 ASSAY OF FERRITIN: CPT

## 2024-09-13 PROCEDURE — 84466 ASSAY OF TRANSFERRIN: CPT

## 2024-09-13 PROCEDURE — 80061 LIPID PANEL: CPT

## 2024-09-13 PROCEDURE — 80053 COMPREHEN METABOLIC PANEL: CPT

## 2024-09-13 PROCEDURE — 85025 COMPLETE CBC W/AUTO DIFF WBC: CPT

## 2024-09-13 PROCEDURE — 36415 COLL VENOUS BLD VENIPUNCTURE: CPT

## 2024-09-13 PROCEDURE — 84443 ASSAY THYROID STIM HORMONE: CPT

## 2024-09-13 RX ORDER — FLUTICASONE PROPIONATE 110 UG/1
1 AEROSOL, METERED RESPIRATORY (INHALATION) 2 TIMES DAILY
Qty: 3 EACH | Refills: 3 | Status: SHIPPED | OUTPATIENT
Start: 2024-09-13 | End: 2025-09-08

## 2024-09-13 RX ORDER — IBUPROFEN 800 MG/1
800 TABLET, FILM COATED ORAL EVERY 8 HOURS PRN
Qty: 20 TABLET | Refills: 0 | Status: SHIPPED | OUTPATIENT
Start: 2024-09-13

## 2024-09-13 NOTE — PROGRESS NOTES
HPI:    Patient ID: Supa Jenkins is a 29 year old female.    HPI     Patient here in office for physical.  States she had Pap done this year, result normal.  Reports painful menstrual cycles with heavy bleeding, present for many years.  Interested in ultrasound of pelvis due to family history of fibroids.    Has history of asthma and uses albuterol inhaler intermittently as needed, mostly when weather is cold.  Reports intermittent cough.  Denies wheezing or chest tightness.  Has cut back on vaping significantly, per patient report.      Reports persistent left arm/shoulder pain, rated 6/10.  Aggravated when sitting at computer for long period of time at work.  Was seen in ER on 9/9/2024 after falling on left upper arm while climbing up stairs.  X-ray of left shoulder and left clavicle completed in ER and showed no abnormal findings.  Patient discharged with lidocaine 5% patch as needed and cyclobenzaprine 10 mg as needed.  Per patient, lidocaine patch does not assist with pain.  Interested in MRI left shoulder for persistent pain with limited range of motion.      Review of Systems   Constitutional: Negative.  Negative for fever.   HENT: Negative.  Negative for ear pain and sore throat.    Eyes: Negative.  Negative for visual disturbance.   Respiratory:  Positive for cough. Negative for shortness of breath.    Cardiovascular: Negative.  Negative for chest pain and palpitations.   Gastrointestinal: Negative.  Negative for abdominal pain, blood in stool, constipation and diarrhea.   Genitourinary: Negative.  Negative for dysuria, frequency, hematuria and menstrual problem.   Musculoskeletal:  Negative for arthralgias and myalgias.        Left shoulder/arm pain   Skin: Negative.  Negative for rash.   Neurological: Negative.  Negative for dizziness, facial asymmetry and headaches.   Psychiatric/Behavioral: Negative.              Current Outpatient Medications   Medication Sig Dispense Refill    fluticasone propionate  (FLOVENT HFA) 110 MCG/ACT Inhalation Aerosol Inhale 1 puff into the lungs 2 (two) times daily. 3 each 3    ibuprofen 800 MG Oral Tab Take 1 tablet (800 mg total) by mouth every 8 (eight) hours as needed for Pain. 20 tablet 0    cyclobenzaprine 10 MG Oral Tab Take 1 tablet (10 mg total) by mouth 3 (three) times daily as needed for Muscle spasms. 20 tablet 0    lidocaine 5 % External Patch Place 1 patch onto the skin daily for 7 days. 7 patch 0    albuterol 108 (90 Base) MCG/ACT Inhalation Aero Soln Inhale 2 puffs into the lungs every 4 (four) hours as needed. 18 g 0     Allergies:No Known Allergies   /68   Pulse 104   Temp 98.3 °F (36.8 °C) (Temporal)   Resp 16   Ht 5' 1\" (1.549 m)   Wt 217 lb (98.4 kg)   LMP 08/25/2024 (Approximate)   SpO2 97%   BMI 41.00 kg/m²   Body mass index is 41 kg/m².  PHYSICAL EXAM:   Physical Exam  Vitals reviewed.   Constitutional:       General: She is not in acute distress.     Appearance: Normal appearance. She is well-developed. She is not ill-appearing.   HENT:      Head: Normocephalic and atraumatic.      Right Ear: Tympanic membrane, ear canal and external ear normal. There is no impacted cerumen.      Left Ear: Tympanic membrane, ear canal and external ear normal. There is no impacted cerumen.      Nose: Nose normal. No congestion.      Mouth/Throat:      Mouth: Mucous membranes are moist.      Pharynx: Oropharynx is clear. No oropharyngeal exudate or posterior oropharyngeal erythema.   Eyes:      General:         Right eye: No discharge.         Left eye: No discharge.      Extraocular Movements: Extraocular movements intact.      Conjunctiva/sclera: Conjunctivae normal.      Pupils: Pupils are equal, round, and reactive to light.   Cardiovascular:      Rate and Rhythm: Normal rate and regular rhythm.      Heart sounds: Normal heart sounds. No murmur heard.     No friction rub. No gallop.   Pulmonary:      Effort: Pulmonary effort is normal. No respiratory distress.       Breath sounds: No stridor. Rhonchi present. No wheezing or rales.      Comments: Rhonchi (bilateral lobes)  Chest:      Chest wall: No tenderness.   Abdominal:      General: Bowel sounds are normal. There is no distension.      Palpations: Abdomen is soft. There is no mass.      Tenderness: There is no abdominal tenderness. There is no right CVA tenderness, left CVA tenderness, guarding or rebound.      Hernia: No hernia is present.   Genitourinary:     General: Normal vulva.      Vagina: Normal.   Musculoskeletal:         General: Tenderness and signs of injury present. Normal range of motion.      Cervical back: Normal range of motion and neck supple.      Comments: Left trapezius/AC joint tenderness, limited ROM   Lymphadenopathy:      Cervical: No cervical adenopathy.   Skin:     General: Skin is warm and dry.      Findings: No rash.   Neurological:      General: No focal deficit present.      Mental Status: She is alert and oriented to person, place, and time.      Cranial Nerves: No cranial nerve deficit.      Sensory: No sensory deficit.      Motor: No weakness.      Deep Tendon Reflexes: Reflexes are normal and symmetric.   Psychiatric:         Mood and Affect: Mood normal.         Behavior: Behavior normal.         Thought Content: Thought content normal.         Judgment: Judgment normal.                ASSESSMENT/PLAN:   1. Routine physical examination  - CBC With Differential With Platelet; Future  - Comp Metabolic Panel (14); Future  - Lipid Panel; Future    2. Mild intermittent asthma without complication (HCC)  - fluticasone propionate (FLOVENT HFA) 110 MCG/ACT Inhalation Aerosol, Inhale 1 puff into the lungs 2 (two) times daily.   -Continue albuterol inhaler as needed    3. Menstrual abnormality  - TSH W Reflex To Free T4 [E]; Future  - US PELVIS (TRANSABDOMINAL AND TRANSVAGINAL) (CPT=76856/47711); Future  - Ferritin [E]; Future  - Iron And Tibc [E]; Future    4. Family history of uterine  fibroid  - US PELVIS (TRANSABDOMINAL AND TRANSVAGINAL) (CPT=76856/65241); Future    5. Acute pain of left shoulder  - MRI SHOULDER, LEFT (CPT=73221); Future      Orders Placed This Encounter   Procedures    CBC With Differential With Platelet    Comp Metabolic Panel (14)    Lipid Panel    TSH W Reflex To Free T4 [E]    Ferritin [E]    Iron And Tibc [E]       Meds This Visit:  Requested Prescriptions     Signed Prescriptions Disp Refills    fluticasone propionate (FLOVENT HFA) 110 MCG/ACT Inhalation Aerosol 3 each 3     Sig: Inhale 1 puff into the lungs 2 (two) times daily.    ibuprofen 800 MG Oral Tab 20 tablet 0     Sig: Take 1 tablet (800 mg total) by mouth every 8 (eight) hours as needed for Pain.       Imaging & Referrals:  US PELVIS (TRANSABDOMINAL AND TRANSVAGINAL) (CPT=76856/01480)  MRI SHOULDER, LEFT (CPT=73221)         ID#[id number]

## 2024-10-19 ENCOUNTER — TELEPHONE (OUTPATIENT)
Dept: FAMILY MEDICINE CLINIC | Facility: CLINIC | Age: 29
End: 2024-10-19

## 2024-10-19 NOTE — TELEPHONE ENCOUNTER
Pharmacy: pulmicort or arnuity or qvar is covered        fluticasone propionate (FLOVENT HFA) 110 MCG/ACT Inhalation Aerosol, Inhale 1 puff into the lungs 2 (two) times daily., Disp: 3 each, Rfl: 3

## 2024-10-21 NOTE — TELEPHONE ENCOUNTER
Medication Detail    Medication Quantity Refills Start End   Beclomethasone Diprop HFA 40 MCG/ACT Inhalation Aerosol, Breath Activated 10.6 g 2 10/21/2024 --   Sig:   Inhale 2 puffs into the lungs every 12 (twelve) hours.     Route:   Inhalation     Order #:   [id number]

## 2024-10-22 ENCOUNTER — TELEPHONE (OUTPATIENT)
Dept: FAMILY MEDICINE CLINIC | Facility: CLINIC | Age: 29
End: 2024-10-22

## 2024-10-22 NOTE — TELEPHONE ENCOUNTER
Flovent was not covered per telephone encounter on 10/19/2024- A new script was sent by [facility] on 10/21/2024 for Beclomethasone hfa

## 2024-11-02 ENCOUNTER — OFFICE VISIT (OUTPATIENT)
Dept: FAMILY MEDICINE CLINIC | Facility: CLINIC | Age: 29
End: 2024-11-02
Payer: COMMERCIAL

## 2024-11-02 VITALS
OXYGEN SATURATION: 96 % | TEMPERATURE: 98 F | HEIGHT: 61 IN | HEART RATE: 93 BPM | BODY MASS INDEX: 41.09 KG/M2 | DIASTOLIC BLOOD PRESSURE: 73 MMHG | SYSTOLIC BLOOD PRESSURE: 109 MMHG | WEIGHT: 217.63 LBS

## 2024-11-02 DIAGNOSIS — J45.901 ASTHMA WITH ACUTE EXACERBATION, UNSPECIFIED ASTHMA SEVERITY, UNSPECIFIED WHETHER PERSISTENT (HCC): Primary | ICD-10-CM

## 2024-11-02 DIAGNOSIS — E66.01 CLASS 3 SEVERE OBESITY WITHOUT SERIOUS COMORBIDITY WITH BODY MASS INDEX (BMI) OF 40.0 TO 44.9 IN ADULT, UNSPECIFIED OBESITY TYPE (HCC): ICD-10-CM

## 2024-11-02 DIAGNOSIS — E66.813 CLASS 3 SEVERE OBESITY WITHOUT SERIOUS COMORBIDITY WITH BODY MASS INDEX (BMI) OF 40.0 TO 44.9 IN ADULT, UNSPECIFIED OBESITY TYPE (HCC): ICD-10-CM

## 2024-11-02 PROCEDURE — 99213 OFFICE O/P EST LOW 20 MIN: CPT

## 2024-11-02 PROCEDURE — 3008F BODY MASS INDEX DOCD: CPT

## 2024-11-02 PROCEDURE — 3074F SYST BP LT 130 MM HG: CPT

## 2024-11-02 PROCEDURE — 3078F DIAST BP <80 MM HG: CPT

## 2024-11-02 NOTE — PROGRESS NOTES
HPI:    Patient ID: Supa Jenkins is a 29 year old female.    HPI     Patient here in office for immediate care follow-up for asthma exacerbation, was seen at outside facility 1.5 weeks ago.  Was given prescription for prednisone (completed medication).  Also using albuterol inhaler twice daily over the last week.  Given prescription for Flovent inhaler, 2 puffs twice daily on 9/13/2024 (insurance will not cover).  States chest tightness, wheezing, and shortness of breath have improved after prednisone. Patient requesting alternative asthma maintenance medication.       Patient also requesting order for hemoglobin A1c due to obesity and family history of diabetes.     Review of Systems   Constitutional: Negative.    Respiratory: Negative.  Negative for cough, chest tightness, shortness of breath and wheezing.    Cardiovascular: Negative.    Gastrointestinal: Negative.    Musculoskeletal: Negative.    Skin: Negative.    Neurological: Negative.    Psychiatric/Behavioral: Negative.              Current Outpatient Medications   Medication Sig Dispense Refill    Beclomethasone Diprop HFA 40 MCG/ACT Inhalation Aerosol, Breath Activated Inhale 2 puffs into the lungs every 12 (twelve) hours. 10.6 g 5    Beclomethasone Diprop HFA 40 MCG/ACT Inhalation Aerosol, Breath Activated Inhale 2 puffs into the lungs every 12 (twelve) hours. (Patient not taking: Reported on 11/2/2024) 10.6 g 2    ibuprofen 800 MG Oral Tab Take 1 tablet (800 mg total) by mouth every 8 (eight) hours as needed for Pain. (Patient not taking: Reported on 11/2/2024) 20 tablet 0    albuterol 108 (90 Base) MCG/ACT Inhalation Aero Soln Inhale 2 puffs into the lungs every 4 (four) hours as needed. (Patient not taking: Reported on 11/2/2024) 18 g 0     Allergies:Allergies[1]   /73   Pulse 93   Temp 97.8 °F (36.6 °C) (Temporal)   Ht 5' 1\" (1.549 m)   Wt 217 lb 9.6 oz (98.7 kg)   LMP 10/25/2024 (Exact Date)   SpO2 96%   BMI 41.12 kg/m²   Body mass  index is 41.12 kg/m².  PHYSICAL EXAM:   Physical Exam  Vitals reviewed.   Constitutional:       General: She is not in acute distress.     Appearance: Normal appearance. She is not ill-appearing.   Cardiovascular:      Rate and Rhythm: Normal rate and regular rhythm.      Heart sounds: Normal heart sounds. No murmur heard.     No friction rub. No gallop.   Pulmonary:      Effort: Pulmonary effort is normal. No respiratory distress.      Breath sounds: Normal breath sounds. No stridor. No wheezing, rhonchi or rales.   Chest:      Chest wall: No tenderness.   Skin:     General: Skin is warm.      Findings: No rash.   Neurological:      General: No focal deficit present.      Mental Status: She is alert and oriented to person, place, and time.   Psychiatric:         Mood and Affect: Mood normal.         Behavior: Behavior normal.         Thought Content: Thought content normal.         Judgment: Judgment normal.                ASSESSMENT/PLAN:   1. Asthma with acute exacerbation, unspecified asthma severity, unspecified whether persistent (HCC)  -Asthma exacerbation symptoms resolved  - Prescription given for Beclomethasone Diprop HFA 40 MCG/ACT Inhalation Aerosol, Breath Activated; Inhale 2 puffs into the lungs every 12 (twelve) hours.  Dispense: 10.6 g; Refill: 5.  Advised patient to call office if insurance does not cover asthma maintenance inhaler for alternative prescription    2. Class 3 severe obesity without serious comorbidity with body mass index (BMI) of 40.0 to 44.9 in adult, unspecified obesity type (HCC)  - Hemoglobin A1C [E]; Future      Orders Placed This Encounter   Procedures    Hemoglobin A1C [E]       Meds This Visit:  Requested Prescriptions     Signed Prescriptions Disp Refills    Beclomethasone Diprop HFA 40 MCG/ACT Inhalation Aerosol, Breath Activated 10.6 g 5     Sig: Inhale 2 puffs into the lungs every 12 (twelve) hours.       Imaging & Referrals:  None         ID#[id number]         [1] No Known  Allergies

## 2024-11-07 ENCOUNTER — LAB ENCOUNTER (OUTPATIENT)
Dept: LAB | Facility: HOSPITAL | Age: 29
End: 2024-11-07
Payer: COMMERCIAL

## 2024-11-07 DIAGNOSIS — E66.01 CLASS 3 SEVERE OBESITY WITHOUT SERIOUS COMORBIDITY WITH BODY MASS INDEX (BMI) OF 40.0 TO 44.9 IN ADULT, UNSPECIFIED OBESITY TYPE (HCC): ICD-10-CM

## 2024-11-07 DIAGNOSIS — E66.813 CLASS 3 SEVERE OBESITY WITHOUT SERIOUS COMORBIDITY WITH BODY MASS INDEX (BMI) OF 40.0 TO 44.9 IN ADULT, UNSPECIFIED OBESITY TYPE (HCC): ICD-10-CM

## 2024-11-07 LAB
EST. AVERAGE GLUCOSE BLD GHB EST-MCNC: 114 MG/DL (ref 68–126)
HBA1C MFR BLD: 5.6 % (ref ?–5.7)

## 2024-11-07 PROCEDURE — 83036 HEMOGLOBIN GLYCOSYLATED A1C: CPT

## 2024-11-07 PROCEDURE — 36415 COLL VENOUS BLD VENIPUNCTURE: CPT

## 2024-12-20 ENCOUNTER — TELEPHONE (OUTPATIENT)
Dept: OBGYN CLINIC | Facility: CLINIC | Age: 29
End: 2024-12-20

## 2024-12-20 NOTE — TELEPHONE ENCOUNTER
Patient states that she is pregnant and would like to start prenatal care. LMP: 11/13/2024. Patient states that she took a over the counter pregnancy test, which was positive.

## 2024-12-23 NOTE — TELEPHONE ENCOUNTER
LMP 10/20/24. 9w1d based on LMP. Cycles every 28 days. Pt agrees to see both male and female providers. Advised pt that we recommend pnv with DHA, folic acid and iron. Booked pt for OBN appt on 1/16/25 and new OB on 1/21/25. Pt agrees to call clinic back should she have any additional questions or concerns.

## 2025-02-04 ENCOUNTER — OFFICE VISIT (OUTPATIENT)
Dept: OTHER | Facility: HOSPITAL | Age: 30
End: 2025-02-04
Attending: NURSE PRACTITIONER

## 2025-02-04 DIAGNOSIS — Z00.00 ROUTINE GENERAL MEDICAL EXAMINATION AT A HEALTH CARE FACILITY: Primary | ICD-10-CM

## 2025-02-04 PROCEDURE — 86480 TB TEST CELL IMMUN MEASURE: CPT

## 2025-02-06 LAB
M TB IFN-G CD4+ T-CELLS BLD-ACNC: 0.01 IU/ML
M TB TUBERC IFN-G BLD QL: NEGATIVE
M TB TUBERC IGNF/MITOGEN IGNF CONTROL: >10 IU/ML
QFT TB1 AG MINUS NIL: 0.01 IU/ML
QFT TB2 AG MINUS NIL: 0 IU/ML

## 2025-02-19 ENCOUNTER — HOSPITAL ENCOUNTER (EMERGENCY)
Facility: HOSPITAL | Age: 30
Discharge: HOME | End: 2025-02-19
Attending: EMERGENCY MEDICINE
Payer: MEDICAID

## 2025-02-19 VITALS
HEART RATE: 106 BPM | RESPIRATION RATE: 16 BRPM | DIASTOLIC BLOOD PRESSURE: 82 MMHG | WEIGHT: 215 LBS | HEIGHT: 61 IN | OXYGEN SATURATION: 95 % | TEMPERATURE: 97 F | SYSTOLIC BLOOD PRESSURE: 128 MMHG | BODY MASS INDEX: 40.59 KG/M2

## 2025-02-19 DIAGNOSIS — J11.1 INFLUENZA: Primary | ICD-10-CM

## 2025-02-19 PROCEDURE — 99283 EMERGENCY DEPT VISIT LOW MDM: CPT

## 2025-02-19 PROCEDURE — 99281 EMR DPT VST MAYX REQ PHY/QHP: CPT

## 2025-02-19 NOTE — ED PROVIDER NOTES
Patient Seen in: [facility] Emergency Department    History     Chief Complaint   Patient presents with    Nausea/Vomiting/Diarrhea       HPI    History is provided by patient/independent historian: Patient  29 year old female with no significant past medical history here with complaints of cough cold symptoms, nausea and diarrhea for the last 4 days.  Positive sick contacts of daughter with similar symptoms.  She has decreased appetite, but is able to tolerate p.o. liquids.    History reviewed. History reviewed. No pertinent past medical history.      History reviewed. History reviewed. No pertinent surgical history.      Home Medications reviewed :  Prescriptions Prior to Admission[1]      History reviewed.   Social History     Socioeconomic History    Marital status: Single   Tobacco Use    Smoking status: Never    Smokeless tobacco: Never   Vaping Use    Vaping status: Every Day   Substance and Sexual Activity    Alcohol use: Not Currently    Drug use: Yes     Types: Cannabis         ROS  Review of Systems   Constitutional:  Positive for appetite change and fever.   HENT:  Positive for congestion.    Respiratory:  Negative for shortness of breath.    Cardiovascular:  Negative for chest pain.   Gastrointestinal:  Positive for diarrhea and nausea.   Musculoskeletal:  Positive for myalgias.   All other systems reviewed and are negative.     All other pertinent organ systems are reviewed and are negative.      Physical Exam     ED Triage Vitals [02/19/25 1319]   /82   Pulse 106   Resp 16   Temp 97.3 °F (36.3 °C)   Temp src    SpO2 95 %   O2 Device None (Room air)     Vital signs reviewed.      Physical Exam  Vitals and nursing note reviewed.   Cardiovascular:      Pulses: Normal pulses.   Pulmonary:      Effort: No respiratory distress.   Abdominal:      General: There is no distension.   Neurological:      Mental Status: She is alert.         ED Course       Labs:   Labs Reviewed - No data to  display      My EKG Interpretation:   As reviewed and Interpreted by me      Imaging Results Available and Reviewed while in ED:   No results found.      Decision rules/scores evaluated: none      Diagnostic labs/tests considered but not ordered: CBC, BMP, UA, CXR    ED Medications Administered: Medications - No data to display         - tolerating PO, daughter tested positive for influenza    MDM       Medical Decision Making      Differential Diagnosis: After obtaining the patient's history, performing the physical exam and reviewing the diagnostics, multiple initial diagnoses were considered based on the presenting problem including viral syndrome, pneumonia, gastroenteritis, UTI    External document review: I personally reviewed available external medical records for any recent pertinent discharge summaries, testing, and procedures - the findings are as follows: none available    Complicating Factors: The patient already  has no past medical history on file. to contribute to the complexity of this ED evaluation.    Procedures performed: none    Discussed management with physician/appropriate source: none    Considered admission/deescalation of care for: none    Social determinants of health affecting patient care: none    Prescription medications considered: discussed continuing current medication regimen    The patient requires continuous monitoring for: flu like symptoms    Shared decision making: discussed possible admission        Disposition and Plan     Clinical Impression:  1. Influenza        Disposition:  Discharge    Follow-up:  your doctor    Follow up        Medications Prescribed:  There are no discharge medications for this patient.                     [1] (Not in a hospital admission)

## 2025-02-19 NOTE — ED INITIAL ASSESSMENT (HPI)
Pt presents with daughter having same symptoms. C/o nausea, diarrhea, chills and headache x 4 days. No appetite but holding down fluids

## 2025-04-04 ENCOUNTER — OFFICE VISIT (OUTPATIENT)
Dept: FAMILY MEDICINE CLINIC | Facility: CLINIC | Age: 30
End: 2025-04-04

## 2025-04-04 VITALS
HEIGHT: 61 IN | SYSTOLIC BLOOD PRESSURE: 128 MMHG | TEMPERATURE: 97 F | BODY MASS INDEX: 40.02 KG/M2 | DIASTOLIC BLOOD PRESSURE: 85 MMHG | OXYGEN SATURATION: 97 % | WEIGHT: 212 LBS | HEART RATE: 98 BPM | RESPIRATION RATE: 16 BRPM

## 2025-04-04 DIAGNOSIS — M25.512 ACUTE PAIN OF BOTH SHOULDERS: ICD-10-CM

## 2025-04-04 DIAGNOSIS — N89.8 VAGINAL DISCHARGE: ICD-10-CM

## 2025-04-04 DIAGNOSIS — M25.511 ACUTE PAIN OF BOTH SHOULDERS: ICD-10-CM

## 2025-04-04 DIAGNOSIS — R10.2 VAGINAL PAIN: Primary | ICD-10-CM

## 2025-04-04 LAB
APPEARANCE: CLEAR
GLUCOSE (URINE DIPSTICK): NEGATIVE MG/DL
KETONES (URINE DIPSTICK): 40 MG/DL
MULTISTIX LOT#: ABNORMAL NUMERIC
NITRITE, URINE: NEGATIVE
PH, URINE: 6 (ref 4.5–8)
PROTEIN (URINE DIPSTICK): 30 MG/DL
SPECIFIC GRAVITY: 1.03 (ref 1–1.03)
UROBILINOGEN,SEMI-QN: 1 MG/DL (ref 0–1.9)

## 2025-04-04 PROCEDURE — 99213 OFFICE O/P EST LOW 20 MIN: CPT

## 2025-04-04 PROCEDURE — 81002 URINALYSIS NONAUTO W/O SCOPE: CPT

## 2025-04-04 RX ORDER — IBUPROFEN 800 MG/1
800 TABLET, FILM COATED ORAL EVERY 8 HOURS PRN
Qty: 45 TABLET | Refills: 0 | Status: SHIPPED | OUTPATIENT
Start: 2025-04-04

## 2025-04-04 NOTE — PROGRESS NOTES
HPI:    Patient ID: Supa Jenkins is a 30 year old female.    HPI     Patient here in office with complaint of intermittent bilateral shoulder pain (right >left), rated 10/10 when present, started 1 month ago.  Most noticeable at night when patient is sleeping.  Reports pain wakes her up out of sleep and she has to pop shoulder back into place for pain to be alleviated.  Denies injury.  Taking Ibuprofen 400 mg as needed with midl improvement.     Also complains of vaginal irritation/pain. States she used razor for shaving and had bleeding and irritation when she last groomed. Notes tenderness at urethra.  Denies urinary frequency, urgency, or burning.  No concerns for STIs.        Review of Systems   Constitutional: Negative.    Respiratory: Negative.     Cardiovascular: Negative.    Genitourinary:  Positive for vaginal pain.   Musculoskeletal:         Bilateral shoulder pain    Skin: Negative.    Neurological: Negative.    Psychiatric/Behavioral: Negative.              Current Outpatient Medications   Medication Sig Dispense Refill    Beclomethasone Diprop HFA 40 MCG/ACT Inhalation Aerosol, Breath Activated Inhale 2 puffs into the lungs every 12 (twelve) hours. 10.6 g 5    Beclomethasone Diprop HFA 40 MCG/ACT Inhalation Aerosol, Breath Activated Inhale 2 puffs into the lungs every 12 (twelve) hours. 10.6 g 2    ibuprofen 800 MG Oral Tab Take 1 tablet (800 mg total) by mouth every 8 (eight) hours as needed for Pain. 20 tablet 0    albuterol 108 (90 Base) MCG/ACT Inhalation Aero Soln Inhale 2 puffs into the lungs every 4 (four) hours as needed. 18 g 0     Allergies:Allergies[1]   /85   Pulse 98   Temp 97.1 °F (36.2 °C) (Temporal)   Resp 16   Ht 5' 1\" (1.549 m)   Wt 212 lb (96.2 kg)   LMP 03/31/2025 (Exact Date)   SpO2 97%   BMI 40.06 kg/m²   Body mass index is 40.06 kg/m².  PHYSICAL EXAM:   Physical Exam  Vitals reviewed.   Constitutional:       General: She is not in acute distress.     Appearance:  Normal appearance. She is not ill-appearing.   Cardiovascular:      Rate and Rhythm: Normal rate and regular rhythm.      Heart sounds: Normal heart sounds. No murmur heard.     No friction rub. No gallop.   Pulmonary:      Effort: Pulmonary effort is normal. No respiratory distress.      Breath sounds: No stridor. No wheezing, rhonchi or rales.   Chest:      Chest wall: No tenderness.   Genitourinary:     General: Normal vulva.      Vagina: Vaginal discharge present.   Musculoskeletal:         General: Tenderness present.      Comments: Bilateral AC joint tenderness, limited ROM    Skin:     General: Skin is warm.      Findings: No rash.   Neurological:      General: No focal deficit present.      Mental Status: She is alert and oriented to person, place, and time.   Psychiatric:         Mood and Affect: Mood normal.         Behavior: Behavior normal.         Thought Content: Thought content normal.         Judgment: Judgment normal.                ASSESSMENT/PLAN:   1. Vaginal pain  - Vaginitis Vaginosis PCR Panel; Future  - URINALYSIS NONAUTO W/O SCOPE  - Urine Culture, Routine; Future  - Vaginitis Vaginosis PCR Panel  - Urine Culture, Routine    2. Vaginal discharge  - Vaginitis Vaginosis PCR Panel; Future    3. Acute pain of both shoulders  - ibuprofen 800 MG Oral Tab,  Take 1 tablet (800 mg total) by mouth every 8 (eight) hours as needed for Pain.   - XR SHOULDER, COMPLETE (MIN 2 VIEWS), LEFT (CPT=73030); Future  - XR SHOULDER, COMPLETE (MIN 2 VIEWS), RIGHT (CPT=73030); Future  - Ortho Referral - In Network      Orders Placed This Encounter   Procedures    URINALYSIS NONAUTO W/O SCOPE    Vaginitis Vaginosis PCR Panel    Urine Culture, Routine       Meds This Visit:  Requested Prescriptions      No prescriptions requested or ordered in this encounter       Imaging & Referrals:  ORTHOPEDIC - INTERNAL  XR SHOULDER, COMPLETE (MIN 2 VIEWS), LEFT (CPT=73030)  XR SHOULDER, COMPLETE (MIN 2 VIEWS), RIGHT  (CPT=73030)         ID#[id number]         [1] No Known Allergies

## 2025-04-05 LAB
BV BACTERIA DNA VAG QL NAA+PROBE: NEGATIVE
C GLABRATA DNA VAG QL NAA+PROBE: NEGATIVE
C KRUSEI DNA VAG QL NAA+PROBE: NEGATIVE
CANDIDA DNA VAG QL NAA+PROBE: NEGATIVE
T VAGINALIS DNA VAG QL NAA+PROBE: NEGATIVE

## 2025-04-07 ENCOUNTER — HOSPITAL ENCOUNTER (OUTPATIENT)
Dept: GENERAL RADIOLOGY | Facility: HOSPITAL | Age: 30
Discharge: HOME | End: 2025-04-07
Payer: MEDICAID

## 2025-04-07 DIAGNOSIS — M25.511 ACUTE PAIN OF BOTH SHOULDERS: ICD-10-CM

## 2025-04-07 DIAGNOSIS — M25.512 ACUTE PAIN OF BOTH SHOULDERS: ICD-10-CM

## 2025-04-07 PROCEDURE — 73030 X-RAY EXAM OF SHOULDER: CPT

## 2025-05-05 ENCOUNTER — PATIENT MESSAGE (OUTPATIENT)
Dept: PHYSICAL THERAPY | Facility: HOSPITAL | Age: 30
End: 2025-05-05

## 2025-08-12 DIAGNOSIS — S63.613D: Primary | ICD-10-CM

## (undated) NOTE — LETTER
Date & Time: 1/22/2024, 9:40 AM  Patient: Supa Jenkins  Encounter Provider(s):    Sukhdeep Mendoza DO       To Whom It May Concern:    Supa Jenkins was seen and treated in our department on 1/22/2024. She can return to work.    If you have any questions or concerns, please do not hesitate to call.        _____________________________  Physician/APC Signature

## (undated) NOTE — LETTER
ASTHMA ACTION PLAN for Supa Jenkins     : 1995     Date: 24  Doctor:  ЮЛИЯ Medel  Phone for doctor or clinic: [facility], [address] [address] [phone]           ACT Goal: 20 or greater    Call your provider if you require your rescue/quick reliever medication more than 2-3 times in a 24 hour period.    If you require your rescue inhaler/medication more than 2-3 times weekly, your asthma may not be under proper control and you should seek medical attention.    *Quick Relievers are Xopenex and Albuterol*    You can use the colors of a traffic light to help learn about your asthma medicines.  Year Round       1. Green - Go! % of Personal Best Peak Flow   Use controller medicine.   Breathing is good  No cough or wheeze  Can work and play Medicine How much to take When to take it    Medications       Steroid Inhalants Instructions     fluticasone propionate (FLOVENT HFA) 110 MCG/ACT Inhalation Aerosol Inhale 1 puff into the lungs 2 (two) times daily.       Sympathomimetics Instructions     albuterol 108 (90 Base) MCG/ACT Inhalation Aero Soln Inhale 2 puffs into the lungs every 4 (four) hours as needed.                    2. Yellow - Caution. 50-79% Personal Best Peak Flow  Use reliever medicine to keep an asthma attack from getting bad.   Cough  Quick Relievers  Wheezing  Tight Chest  Wake up at night Medicine How much to take When to take it    If symptoms are not improving in 24-48 hrs, call office for further instructions  Medications       Steroid Inhalants Instructions     fluticasone propionate (FLOVENT HFA) 110 MCG/ACT Inhalation Aerosol Inhale 1 puff into the lungs 2 (two) times daily.       Sympathomimetics Instructions     albuterol 108 (90 Base) MCG/ACT Inhalation Aero Soln Inhale 2 puffs into the lungs every 4 (four) hours as needed.                    3. Red - Stop! Danger! <50% Personal Best Peak  Flow  Continue Controller Medications But ADD:   Medicine not helping  Breathing is hard and fast  Nose opens wide  Can't walk  Ribs show  Can't talk well Medicine How much to take When to take it    If your symptoms do not improve in ONE hour -  go to the emergency room or call 911 immediately! If symptoms improve, call office for appointment immediately.    Albuterol inhaler 2 puffs every 20 minutes for three treatments       Don't forget:  Rinse mouth after using inhaler  Use spacer for inhaler  Remember to get your Flu vaccine every fall!    [x] Asthma Action Plan reviewed with the caregiver and patient, and a copy of the plan was given to the patient/caregiver.   [] Asthma Action Plan reviewed with the caregiver and patient on the phone, and copy mailed to patient/caregiver or sent via [employer].     Signatures:   Provider  ЮЛИЯ Medel Patient  Supa Alvest Caretaker

## (undated) NOTE — LETTER
Date & Time: 4/10/2022, 12:50 PM  Patient: Elmo Graff  Encounter Provider(s):    Jerry Zapata MD       To Whom It May Concern:    Elmo Graff was seen and treated in our department on 4/10/2022. She should not return to work until 4/11/2022.     If you have any questions or concerns, please do not hesitate to call.        _____________________________  Physician/APC Signature

## (undated) NOTE — ED AVS SNAPSHOT
Steve Musa   MRN: [id number]    Department:  [facility]   Date of Visit:  1/10/2020           Disclosure     Insurance plans vary and the physician(s) referred by the ER may not be covered by your plan.  Please contact yo CARE PHYSICIAN AT ONCE OR RETURN IMMEDIATELY TO THE EMERGENCY DEPARTMENT. If you have been prescribed any medication(s), please fill your prescription right away and begin taking the medication(s) as directed.   If you believe that any of the medications

## (undated) NOTE — LETTER
Date & Time: 8/21/2023, 9:06 AM  Patient: Vicki Almaraz  Encounter Provider(s):    Fortino Ayers MD       To Whom It May Concern:    Vicki Almaraz was seen and treated in our department on 8/21/2023. She can return to work on 8/24/23.     If you have any questions or concerns, please do not hesitate to call.        _____________________________  Physician/APC Signature

## (undated) NOTE — ED AVS SNAPSHOT
Ponce Haney   MRN: [id number]    Department:  [facility] Emergency Department   Date of Visit:  3/29/2018           Disclosure     Insurance plans vary and the physician(s) referred by the ER may not be covered by your plan.  Please contact yo CARE PHYSICIAN AT ONCE OR RETURN IMMEDIATELY TO THE EMERGENCY DEPARTMENT. If you have been prescribed any medication(s), please fill your prescription right away and begin taking the medication(s) as directed.   If you believe that any of the medications

## (undated) NOTE — ED AVS SNAPSHOT
[facility]   MRN: [id number]    Department:  [facility] Department   Date of Visit:  7/22/2019           Disclosure     Insurance plans vary and the physician(s) referred by the ER may not be covered by your plan.  Please contact yo CARE PHYSICIAN AT ONCE OR RETURN IMMEDIATELY TO THE EMERGENCY DEPARTMENT. If you have been prescribed any medication(s), please fill your prescription right away and begin taking the medication(s) as directed.   If you believe that any of the medications

## (undated) NOTE — ED AVS SNAPSHOT
Thanh Trinidad   MRN: [id number]    Department:  [facility] Emergency Department   Date of Visit:  7/26/2019           Disclosure     Insurance plans vary and the physician(s) referred by the ER may not be covered by your plan.  Please contact yo CARE PHYSICIAN AT ONCE OR RETURN IMMEDIATELY TO THE EMERGENCY DEPARTMENT. If you have been prescribed any medication(s), please fill your prescription right away and begin taking the medication(s) as directed.   If you believe that any of the medications